# Patient Record
Sex: FEMALE | Race: WHITE | NOT HISPANIC OR LATINO | Employment: OTHER | URBAN - METROPOLITAN AREA
[De-identification: names, ages, dates, MRNs, and addresses within clinical notes are randomized per-mention and may not be internally consistent; named-entity substitution may affect disease eponyms.]

---

## 2017-03-24 ENCOUNTER — APPOINTMENT (OUTPATIENT)
Dept: GENERAL RADIOLOGY | Facility: HOSPITAL | Age: 82
End: 2017-03-24

## 2017-03-24 ENCOUNTER — HOSPITAL ENCOUNTER (OUTPATIENT)
Facility: HOSPITAL | Age: 82
Setting detail: OBSERVATION
LOS: 1 days | Discharge: HOME | End: 2017-03-25
Attending: INTERNAL MEDICINE | Admitting: HOSPITALIST

## 2017-03-24 ENCOUNTER — APPOINTMENT (OUTPATIENT)
Dept: MRI IMAGING | Facility: HOSPITAL | Age: 82
End: 2017-03-24

## 2017-03-24 ENCOUNTER — APPOINTMENT (OUTPATIENT)
Dept: CARDIOLOGY | Facility: HOSPITAL | Age: 82
End: 2017-03-24

## 2017-03-24 ENCOUNTER — APPOINTMENT (OUTPATIENT)
Dept: CARDIOLOGY | Facility: HOSPITAL | Age: 82
End: 2017-03-24
Attending: NEUROLOGICAL SURGERY

## 2017-03-24 PROBLEM — D69.1 THROMBOCYTOPATHIA: Status: ACTIVE | Noted: 2017-03-24

## 2017-03-24 PROBLEM — D64.9 ANEMIA: Status: ACTIVE | Noted: 2017-03-24

## 2017-03-24 PROBLEM — I65.22 LEFT CAROTID STENOSIS: Status: ACTIVE | Noted: 2017-03-24

## 2017-03-24 PROBLEM — I50.9 CHF (CONGESTIVE HEART FAILURE): Chronic | Status: ACTIVE | Noted: 2017-03-24

## 2017-03-24 PROBLEM — E78.5 HYPERLIPIDEMIA: Chronic | Status: ACTIVE | Noted: 2017-03-24

## 2017-03-24 PROBLEM — I48.91 ATRIAL FIBRILLATION (HCC): Chronic | Status: ACTIVE | Noted: 2017-03-24

## 2017-03-24 PROBLEM — I65.29 CAROTID STENOSIS: Status: ACTIVE | Noted: 2017-03-24

## 2017-03-24 PROBLEM — E03.9 HYPOTHYROIDISM: Status: ACTIVE | Noted: 2017-03-24

## 2017-03-24 PROBLEM — E87.6 HYPOKALEMIA: Status: ACTIVE | Noted: 2017-03-24

## 2017-03-24 PROBLEM — I10 HYPERTENSION: Chronic | Status: ACTIVE | Noted: 2017-03-24

## 2017-03-24 LAB
ALBUMIN SERPL-MCNC: 3.7 G/DL (ref 3.2–4.8)
ALBUMIN/GLOB SERPL: 1.4 G/DL (ref 1.5–2.5)
ALP SERPL-CCNC: 56 U/L (ref 25–100)
ALT SERPL W P-5'-P-CCNC: 14 U/L (ref 7–40)
ANION GAP SERPL CALCULATED.3IONS-SCNC: 3 MMOL/L (ref 3–11)
APTT PPP: 29.2 SECONDS (ref 24–31)
ARTICHOKE IGE QN: 56 MG/DL (ref 0–130)
AST SERPL-CCNC: 19 U/L (ref 0–33)
BASOPHILS # BLD AUTO: 0.02 10*3/MM3 (ref 0–0.2)
BASOPHILS NFR BLD AUTO: 0.4 % (ref 0–1)
BH CV ECHO MEAS - AO MAX PG (FULL): 54.3 MMHG
BH CV ECHO MEAS - AO MAX PG: 58.2 MMHG
BH CV ECHO MEAS - AO MEAN PG (FULL): 29 MMHG
BH CV ECHO MEAS - AO MEAN PG: 31 MMHG
BH CV ECHO MEAS - AO ROOT AREA (BSA CORRECTED): 1.4
BH CV ECHO MEAS - AO ROOT AREA: 4.5 CM^2
BH CV ECHO MEAS - AO ROOT DIAM: 2.4 CM
BH CV ECHO MEAS - AO V2 MAX: 381.2 CM/SEC
BH CV ECHO MEAS - AO V2 MEAN: 256.8 CM/SEC
BH CV ECHO MEAS - AO V2 VTI: 83.2 CM
BH CV ECHO MEAS - AVA(I,A): 0.71 CM^2
BH CV ECHO MEAS - AVA(I,D): 0.71 CM^2
BH CV ECHO MEAS - AVA(V,A): 0.65 CM^2
BH CV ECHO MEAS - AVA(V,D): 0.65 CM^2
BH CV ECHO MEAS - BSA(HAYCOCK): 1.7 M^2
BH CV ECHO MEAS - BSA: 1.7 M^2
BH CV ECHO MEAS - BZI_BMI: 24.9 KILOGRAMS/M^2
BH CV ECHO MEAS - BZI_METRIC_HEIGHT: 162.6 CM
BH CV ECHO MEAS - BZI_METRIC_WEIGHT: 65.8 KG
BH CV ECHO MEAS - EDV(CUBED): 42.5 ML
BH CV ECHO MEAS - EDV(TEICH): 50.5 ML
BH CV ECHO MEAS - EF(CUBED): 72.1 %
BH CV ECHO MEAS - EF(TEICH): 64.9 %
BH CV ECHO MEAS - ESV(CUBED): 11.9 ML
BH CV ECHO MEAS - ESV(TEICH): 17.7 ML
BH CV ECHO MEAS - FS: 34.7 %
BH CV ECHO MEAS - IVS/LVPW: 1.2
BH CV ECHO MEAS - IVSD: 1.3 CM
BH CV ECHO MEAS - LA DIMENSION: 3.8 CM
BH CV ECHO MEAS - LA/AO: 1.6
BH CV ECHO MEAS - LAT PEAK E' VEL: 7.7 CM/SEC
BH CV ECHO MEAS - LV MASS(C)D: 129.3 GRAMS
BH CV ECHO MEAS - LV MASS(C)DI: 75.8 GRAMS/M^2
BH CV ECHO MEAS - LV MAX PG: 3.9 MMHG
BH CV ECHO MEAS - LV MEAN PG: 2 MMHG
BH CV ECHO MEAS - LV V1 MAX: 98.1 CM/SEC
BH CV ECHO MEAS - LV V1 MEAN: 67 CM/SEC
BH CV ECHO MEAS - LV V1 VTI: 23.1 CM
BH CV ECHO MEAS - LVIDD: 3.5 CM
BH CV ECHO MEAS - LVIDS: 2.3 CM
BH CV ECHO MEAS - LVOT AREA (M): 2.5 CM^2
BH CV ECHO MEAS - LVOT AREA: 2.5 CM^2
BH CV ECHO MEAS - LVOT DIAM: 1.8 CM
BH CV ECHO MEAS - LVPWD: 1.1 CM
BH CV ECHO MEAS - MED PEAK E' VEL: 6.6 CM/SEC
BH CV ECHO MEAS - MV E MAX VEL: 115 CM/SEC
BH CV ECHO MEAS - MV MAX PG: 6.9 MMHG
BH CV ECHO MEAS - MV MEAN PG: 2 MMHG
BH CV ECHO MEAS - MV V2 MAX: 131 CM/SEC
BH CV ECHO MEAS - MV V2 MEAN: 69.3 CM/SEC
BH CV ECHO MEAS - MV V2 VTI: 26.4 CM
BH CV ECHO MEAS - MVA(VTI): 2.2 CM^2
BH CV ECHO MEAS - PA ACC SLOPE: 688.5 CM/SEC^2
BH CV ECHO MEAS - PA ACC TIME: 0.1 SEC
BH CV ECHO MEAS - PA MAX PG: 2.6 MMHG
BH CV ECHO MEAS - PA PR(ACCEL): 36 MMHG
BH CV ECHO MEAS - PA V2 MAX: 80.4 CM/SEC
BH CV ECHO MEAS - RAP SYSTOLE: 3 MMHG
BH CV ECHO MEAS - RVDD: 2.4 CM
BH CV ECHO MEAS - RVSP: 55 MMHG
BH CV ECHO MEAS - SI(AO): 220.7 ML/M^2
BH CV ECHO MEAS - SI(CUBED): 18 ML/M^2
BH CV ECHO MEAS - SI(LVOT): 34.5 ML/M^2
BH CV ECHO MEAS - SI(TEICH): 19.2 ML/M^2
BH CV ECHO MEAS - SV(AO): 376.5 ML
BH CV ECHO MEAS - SV(CUBED): 30.7 ML
BH CV ECHO MEAS - SV(LVOT): 58.8 ML
BH CV ECHO MEAS - SV(TEICH): 32.8 ML
BH CV ECHO MEAS - TAPSE (>1.6): 1.7 CM2
BH CV ECHO MEAS - TR MAX VEL: 360 CM/SEC
BH CV XLRA - RV BASE: 3.7 CM
BH CV XLRA - RV LENGTH: 6.4 CM
BH CV XLRA - RV MID: 2.7 CM
BH CV XLRA - TDI S': 7.8 CM/SEC
BH CV XLRA MEAS LEFT CCA RATIO VEL: 70.4 CM/SEC
BH CV XLRA MEAS LEFT DIST CCA EDV: 10.1 CM/SEC
BH CV XLRA MEAS LEFT DIST CCA PSV: 70.4 CM/SEC
BH CV XLRA MEAS LEFT DIST ICA EDV: 25.1 CM/SEC
BH CV XLRA MEAS LEFT DIST ICA PSV: 209 CM/SEC
BH CV XLRA MEAS LEFT ICA RATIO VEL: 151 CM/SEC
BH CV XLRA MEAS LEFT ICA/CCA RATIO: 2.1
BH CV XLRA MEAS LEFT MID ICA EDV: 25.1 CM/SEC
BH CV XLRA MEAS LEFT MID ICA PSV: 151 CM/SEC
BH CV XLRA MEAS LEFT PROX CCA EDV: 8.2 CM/SEC
BH CV XLRA MEAS LEFT PROX CCA PSV: 59.7 CM/SEC
BH CV XLRA MEAS LEFT PROX ECA PSV: 69.2 CM/SEC
BH CV XLRA MEAS LEFT PROX ICA EDV: 14.2 CM/SEC
BH CV XLRA MEAS LEFT PROX ICA PSV: 72.7 CM/SEC
BH CV XLRA MEAS LEFT PROX SCLA PSV: 131 CM/SEC
BH CV XLRA MEAS RIGHT CCA RATIO VEL: 56.3 CM/SEC
BH CV XLRA MEAS RIGHT DIST CCA EDV: 7.9 CM/SEC
BH CV XLRA MEAS RIGHT DIST CCA PSV: 56.3 CM/SEC
BH CV XLRA MEAS RIGHT DIST ICA EDV: 30.8 CM/SEC
BH CV XLRA MEAS RIGHT DIST ICA PSV: 124 CM/SEC
BH CV XLRA MEAS RIGHT ICA RATIO VEL: 98.7 CM/SEC
BH CV XLRA MEAS RIGHT ICA/CCA RATIO: 1.8
BH CV XLRA MEAS RIGHT MID ICA EDV: 27.7 CM/SEC
BH CV XLRA MEAS RIGHT MID ICA PSV: 98.7 CM/SEC
BH CV XLRA MEAS RIGHT PROX CCA EDV: 11.8 CM/SEC
BH CV XLRA MEAS RIGHT PROX CCA PSV: 58.5 CM/SEC
BH CV XLRA MEAS RIGHT PROX ECA PSV: 70.7 CM/SEC
BH CV XLRA MEAS RIGHT PROX ICA EDV: 15.3 CM/SEC
BH CV XLRA MEAS RIGHT PROX ICA PSV: 72 CM/SEC
BH CV XLRA MEAS RIGHT PROX SCLA PSV: 51.1 CM/SEC
BH CV XLRA MEAS RIGHT VERTEBRAL A EDV: 13.8 CM/SEC
BH CV XLRA MEAS RIGHT VERTEBRAL A PSV: 64.1 CM/SEC
BILIRUB SERPL-MCNC: 0.4 MG/DL (ref 0.3–1.2)
BUN BLD-MCNC: 12 MG/DL (ref 9–23)
BUN/CREAT SERPL: 17.1 (ref 7–25)
CALCIUM SPEC-SCNC: 8.9 MG/DL (ref 8.7–10.4)
CHLORIDE SERPL-SCNC: 105 MMOL/L (ref 99–109)
CHOLEST SERPL-MCNC: 108 MG/DL (ref 0–200)
CO2 SERPL-SCNC: 30 MMOL/L (ref 20–31)
CREAT BLD-MCNC: 0.7 MG/DL (ref 0.6–1.3)
DEPRECATED RDW RBC AUTO: 50.4 FL (ref 37–54)
E/E' RATIO: 16
EOSINOPHIL # BLD AUTO: 0.27 10*3/MM3 (ref 0.1–0.3)
EOSINOPHIL NFR BLD AUTO: 4.9 % (ref 0–3)
ERYTHROCYTE [DISTWIDTH] IN BLOOD BY AUTOMATED COUNT: 13.4 % (ref 11.3–14.5)
GFR SERPL CREATININE-BSD FRML MDRD: 80 ML/MIN/1.73
GLOBULIN UR ELPH-MCNC: 2.7 GM/DL
GLUCOSE BLD-MCNC: 83 MG/DL (ref 70–100)
HBA1C MFR BLD: 5.3 % (ref 4.8–5.6)
HCT VFR BLD AUTO: 33.9 % (ref 34.5–44)
HDLC SERPL-MCNC: 33 MG/DL (ref 40–60)
HGB BLD-MCNC: 11.1 G/DL (ref 11.5–15.5)
IMM GRANULOCYTES # BLD: 0.01 10*3/MM3 (ref 0–0.03)
IMM GRANULOCYTES NFR BLD: 0.2 % (ref 0–0.6)
INR PPP: 1.12
LEFT ATRIUM VOLUME INDEX: 45 ML/M2
LEFT ATRIUM VOLUME: 78 CM3
LV EF 2D ECHO EST: 60 %
LYMPHOCYTES # BLD AUTO: 1.59 10*3/MM3 (ref 0.6–4.8)
LYMPHOCYTES NFR BLD AUTO: 28.8 % (ref 24–44)
MAGNESIUM SERPL-MCNC: 1.9 MG/DL (ref 1.3–2.7)
MCH RBC QN AUTO: 33.4 PG (ref 27–31)
MCHC RBC AUTO-ENTMCNC: 32.7 G/DL (ref 32–36)
MCV RBC AUTO: 102.1 FL (ref 80–99)
MONOCYTES # BLD AUTO: 0.67 10*3/MM3 (ref 0–1)
MONOCYTES NFR BLD AUTO: 12.1 % (ref 0–12)
NEUTROPHILS # BLD AUTO: 2.96 10*3/MM3 (ref 1.5–8.3)
NEUTROPHILS NFR BLD AUTO: 53.6 % (ref 41–71)
PHOSPHATE SERPL-MCNC: 3.9 MG/DL (ref 2.4–5.1)
PLATELET # BLD AUTO: 120 10*3/MM3 (ref 150–450)
PMV BLD AUTO: 9.5 FL (ref 6–12)
POTASSIUM BLD-SCNC: 3.5 MMOL/L (ref 3.5–5.5)
PROT SERPL-MCNC: 6.4 G/DL (ref 5.7–8.2)
PROTHROMBIN TIME: 12.3 SECONDS (ref 9.6–11.5)
RBC # BLD AUTO: 3.32 10*6/MM3 (ref 3.89–5.14)
SODIUM BLD-SCNC: 138 MMOL/L (ref 132–146)
T4 FREE SERPL-MCNC: 1.03 NG/DL (ref 0.89–1.76)
TRIGL SERPL-MCNC: 69 MG/DL (ref 0–150)
TROPONIN I SERPL-MCNC: <0.006 NG/ML
TSH SERPL DL<=0.05 MIU/L-ACNC: 1.94 MIU/ML (ref 0.35–5.35)
WBC NRBC COR # BLD: 5.52 10*3/MM3 (ref 3.5–10.8)

## 2017-03-24 PROCEDURE — 93306 TTE W/DOPPLER COMPLETE: CPT | Performed by: INTERNAL MEDICINE

## 2017-03-24 PROCEDURE — 99220 PR INITIAL OBSERVATION CARE/DAY 70 MINUTES: CPT | Performed by: INTERNAL MEDICINE

## 2017-03-24 PROCEDURE — 99221 1ST HOSP IP/OBS SF/LOW 40: CPT | Performed by: NEUROLOGICAL SURGERY

## 2017-03-24 PROCEDURE — 85610 PROTHROMBIN TIME: CPT | Performed by: NURSE PRACTITIONER

## 2017-03-24 PROCEDURE — 93880 EXTRACRANIAL BILAT STUDY: CPT

## 2017-03-24 PROCEDURE — G0378 HOSPITAL OBSERVATION PER HR: HCPCS

## 2017-03-24 PROCEDURE — 93880 EXTRACRANIAL BILAT STUDY: CPT | Performed by: INTERNAL MEDICINE

## 2017-03-24 PROCEDURE — 84439 ASSAY OF FREE THYROXINE: CPT | Performed by: NURSE PRACTITIONER

## 2017-03-24 PROCEDURE — 85730 THROMBOPLASTIN TIME PARTIAL: CPT | Performed by: NURSE PRACTITIONER

## 2017-03-24 PROCEDURE — 93010 ELECTROCARDIOGRAM REPORT: CPT | Performed by: INTERNAL MEDICINE

## 2017-03-24 PROCEDURE — 70551 MRI BRAIN STEM W/O DYE: CPT

## 2017-03-24 PROCEDURE — 84484 ASSAY OF TROPONIN QUANT: CPT | Performed by: NURSE PRACTITIONER

## 2017-03-24 PROCEDURE — 83036 HEMOGLOBIN GLYCOSYLATED A1C: CPT | Performed by: NURSE PRACTITIONER

## 2017-03-24 PROCEDURE — 71010 HC CHEST PA OR AP: CPT

## 2017-03-24 PROCEDURE — 84443 ASSAY THYROID STIM HORMONE: CPT | Performed by: NURSE PRACTITIONER

## 2017-03-24 PROCEDURE — 83735 ASSAY OF MAGNESIUM: CPT | Performed by: NURSE PRACTITIONER

## 2017-03-24 PROCEDURE — C8929 TTE W OR WO FOL WCON,DOPPLER: HCPCS

## 2017-03-24 PROCEDURE — 85025 COMPLETE CBC W/AUTO DIFF WBC: CPT | Performed by: NURSE PRACTITIONER

## 2017-03-24 PROCEDURE — 80061 LIPID PANEL: CPT | Performed by: NURSE PRACTITIONER

## 2017-03-24 PROCEDURE — 93005 ELECTROCARDIOGRAM TRACING: CPT | Performed by: PHYSICIAN ASSISTANT

## 2017-03-24 PROCEDURE — 25010000002 SULFUR HEXAFLUORIDE MICROSPH 60.7-25 MG RECONSTITUTED SUSPENSION: Performed by: INTERNAL MEDICINE

## 2017-03-24 PROCEDURE — 84100 ASSAY OF PHOSPHORUS: CPT | Performed by: NURSE PRACTITIONER

## 2017-03-24 PROCEDURE — 84481 FREE ASSAY (FT-3): CPT | Performed by: NURSE PRACTITIONER

## 2017-03-24 PROCEDURE — 80053 COMPREHEN METABOLIC PANEL: CPT | Performed by: NURSE PRACTITIONER

## 2017-03-24 RX ORDER — CLOPIDOGREL BISULFATE 75 MG/1
75 TABLET ORAL DAILY
COMMUNITY
End: 2022-03-23

## 2017-03-24 RX ORDER — POTASSIUM CHLORIDE 1.5 G/1.77G
40 POWDER, FOR SOLUTION ORAL AS NEEDED
Status: DISCONTINUED | OUTPATIENT
Start: 2017-03-24 | End: 2017-03-25 | Stop reason: HOSPADM

## 2017-03-24 RX ORDER — ESCITALOPRAM OXALATE 20 MG/1
20 TABLET ORAL DAILY
COMMUNITY
End: 2022-08-12

## 2017-03-24 RX ORDER — GABAPENTIN 600 MG/1
600 TABLET ORAL NIGHTLY
COMMUNITY
End: 2022-06-15 | Stop reason: SDUPTHER

## 2017-03-24 RX ORDER — GABAPENTIN 300 MG/1
600 CAPSULE ORAL NIGHTLY
Status: DISCONTINUED | OUTPATIENT
Start: 2017-03-24 | End: 2017-03-25 | Stop reason: HOSPADM

## 2017-03-24 RX ORDER — POTASSIUM CHLORIDE 750 MG/1
40 CAPSULE, EXTENDED RELEASE ORAL AS NEEDED
Status: DISCONTINUED | OUTPATIENT
Start: 2017-03-24 | End: 2017-03-25 | Stop reason: HOSPADM

## 2017-03-24 RX ORDER — DOCUSATE SODIUM 100 MG/1
100 CAPSULE, LIQUID FILLED ORAL 2 TIMES DAILY PRN
Status: DISCONTINUED | OUTPATIENT
Start: 2017-03-24 | End: 2017-03-25 | Stop reason: HOSPADM

## 2017-03-24 RX ORDER — ESCITALOPRAM OXALATE 20 MG/1
20 TABLET ORAL DAILY
Status: DISCONTINUED | OUTPATIENT
Start: 2017-03-24 | End: 2017-03-25 | Stop reason: HOSPADM

## 2017-03-24 RX ORDER — FAMOTIDINE 20 MG/1
40 TABLET, FILM COATED ORAL DAILY
Status: DISCONTINUED | OUTPATIENT
Start: 2017-03-24 | End: 2017-03-25 | Stop reason: HOSPADM

## 2017-03-24 RX ORDER — POTASSIUM CHLORIDE 7.45 MG/ML
10 INJECTION INTRAVENOUS
Status: DISCONTINUED | OUTPATIENT
Start: 2017-03-24 | End: 2017-03-25 | Stop reason: HOSPADM

## 2017-03-24 RX ORDER — GABAPENTIN 300 MG/1
300 CAPSULE ORAL EVERY MORNING
Status: DISCONTINUED | OUTPATIENT
Start: 2017-03-24 | End: 2017-03-25 | Stop reason: HOSPADM

## 2017-03-24 RX ORDER — AMLODIPINE BESYLATE 5 MG/1
5 TABLET ORAL DAILY
Status: DISCONTINUED | OUTPATIENT
Start: 2017-03-24 | End: 2017-03-25 | Stop reason: HOSPADM

## 2017-03-24 RX ORDER — MECLIZINE HYDROCHLORIDE 25 MG/1
25 TABLET ORAL 3 TIMES DAILY PRN
Status: DISCONTINUED | OUTPATIENT
Start: 2017-03-24 | End: 2017-03-25 | Stop reason: HOSPADM

## 2017-03-24 RX ORDER — CLONIDINE HYDROCHLORIDE 0.1 MG/1
0.1 TABLET ORAL 2 TIMES DAILY PRN
Status: DISCONTINUED | OUTPATIENT
Start: 2017-03-24 | End: 2017-03-25 | Stop reason: HOSPADM

## 2017-03-24 RX ORDER — ATORVASTATIN CALCIUM 20 MG/1
20 TABLET, FILM COATED ORAL NIGHTLY
Status: DISCONTINUED | OUTPATIENT
Start: 2017-03-24 | End: 2017-03-25 | Stop reason: HOSPADM

## 2017-03-24 RX ORDER — HYDROCODONE BITARTRATE AND ACETAMINOPHEN 10; 325 MG/1; MG/1
2 TABLET ORAL DAILY
COMMUNITY
End: 2022-04-18 | Stop reason: SDUPTHER

## 2017-03-24 RX ORDER — FUROSEMIDE 20 MG/1
10 TABLET ORAL DAILY
COMMUNITY
End: 2022-03-23

## 2017-03-24 RX ORDER — GABAPENTIN 300 MG/1
300 CAPSULE ORAL EVERY MORNING
COMMUNITY
End: 2022-04-18 | Stop reason: SDUPTHER

## 2017-03-24 RX ORDER — FAMOTIDINE 40 MG/1
40 TABLET, FILM COATED ORAL DAILY
COMMUNITY
End: 2022-07-25

## 2017-03-24 RX ORDER — HYDROCODONE BITARTRATE AND ACETAMINOPHEN 7.5; 325 MG/1; MG/1
1 TABLET ORAL 2 TIMES DAILY PRN
Status: DISCONTINUED | OUTPATIENT
Start: 2017-03-24 | End: 2017-03-25 | Stop reason: HOSPADM

## 2017-03-24 RX ORDER — CLOPIDOGREL BISULFATE 75 MG/1
75 TABLET ORAL DAILY
Status: DISCONTINUED | OUTPATIENT
Start: 2017-03-24 | End: 2017-03-25 | Stop reason: HOSPADM

## 2017-03-24 RX ORDER — MECLIZINE HYDROCHLORIDE 25 MG/1
25 TABLET ORAL 3 TIMES DAILY PRN
COMMUNITY
End: 2022-03-23

## 2017-03-24 RX ORDER — SODIUM CHLORIDE 0.9 % (FLUSH) 0.9 %
1-10 SYRINGE (ML) INJECTION AS NEEDED
Status: DISCONTINUED | OUTPATIENT
Start: 2017-03-24 | End: 2017-03-25 | Stop reason: HOSPADM

## 2017-03-24 RX ORDER — ACETAMINOPHEN 325 MG/1
650 TABLET ORAL EVERY 4 HOURS PRN
Status: DISCONTINUED | OUTPATIENT
Start: 2017-03-24 | End: 2017-03-25 | Stop reason: HOSPADM

## 2017-03-24 RX ORDER — ATORVASTATIN CALCIUM 20 MG/1
20 TABLET, FILM COATED ORAL DAILY
COMMUNITY
End: 2022-03-23

## 2017-03-24 RX ORDER — ALENDRONATE SODIUM 70 MG/1
70 TABLET ORAL
COMMUNITY
Start: 2017-03-26 | End: 2022-05-31 | Stop reason: SDUPTHER

## 2017-03-24 RX ORDER — CLOPIDOGREL BISULFATE 75 MG/1
75 TABLET ORAL DAILY
Status: DISCONTINUED | OUTPATIENT
Start: 2017-03-24 | End: 2017-03-24

## 2017-03-24 RX ORDER — FUROSEMIDE 20 MG/1
10 TABLET ORAL DAILY
Status: DISCONTINUED | OUTPATIENT
Start: 2017-03-24 | End: 2017-03-25 | Stop reason: HOSPADM

## 2017-03-24 RX ORDER — ONDANSETRON 2 MG/ML
4 INJECTION INTRAMUSCULAR; INTRAVENOUS EVERY 6 HOURS PRN
Status: DISCONTINUED | OUTPATIENT
Start: 2017-03-24 | End: 2017-03-25 | Stop reason: HOSPADM

## 2017-03-24 RX ADMIN — GABAPENTIN 300 MG: 300 CAPSULE ORAL at 11:43

## 2017-03-24 RX ADMIN — FAMOTIDINE 40 MG: 20 TABLET ORAL at 11:42

## 2017-03-24 RX ADMIN — RIVAROXABAN 20 MG: 20 TABLET, FILM COATED ORAL at 11:43

## 2017-03-24 RX ADMIN — SULFUR HEXAFLUORIDE 2 ML: KIT at 14:30

## 2017-03-24 RX ADMIN — FUROSEMIDE 10 MG: 20 TABLET ORAL at 11:43

## 2017-03-24 RX ADMIN — ESCITALOPRAM OXALATE 20 MG: 20 TABLET ORAL at 11:43

## 2017-03-24 RX ADMIN — METOPROLOL TARTRATE 12.5 MG: 25 TABLET, FILM COATED ORAL at 18:32

## 2017-03-24 RX ADMIN — METOPROLOL TARTRATE 12.5 MG: 25 TABLET, FILM COATED ORAL at 09:09

## 2017-03-24 RX ADMIN — ATORVASTATIN CALCIUM 20 MG: 20 TABLET, FILM COATED ORAL at 20:03

## 2017-03-24 RX ADMIN — GABAPENTIN 600 MG: 300 CAPSULE ORAL at 20:03

## 2017-03-24 RX ADMIN — CLOPIDOGREL BISULFATE 75 MG: 75 TABLET ORAL at 11:43

## 2017-03-24 RX ADMIN — AMLODIPINE BESYLATE 5 MG: 5 TABLET ORAL at 09:09

## 2017-03-24 NOTE — H&P
"  [facility] Medicine Services  HISTORY AND PHYSICAL    Primary Care Physician: Zeeshan Ross MD    Subjective     Chief Complaint: \"Tingling in fingers, left neck pain.\"    History of Present Illness    Mrs. Green presented to [facility] this evening for numbness and tingling that started in her left 3rd-5th fingers, and spread into her hand and palm. Onset 2100. Moderate. Reports has mostly improved without intervention. She reports associated pain to the left side of her neck since around the same time, moderate, not improving. Son reports she also complained of a headache since 3/23 AM as well that they did not think anything of at the time. Pt reports she was concerned because she had a TIA about 15 years ago. She has been transferred here to [facility] for associated findings of significant LEFT carotid stenosis. To note, she has a surgical history that includes a RIGHT carotid endarterectomy around 1999 when had TIA.    Review of Systems   Constitutional: Negative for activity change, appetite change, chills, fever and unexpected weight change.        Reports 20 lb wt loss since c diff 2+ years ago she never gained back but no recent wt change.   HENT: Positive for sinus pressure. Negative for trouble swallowing.    Eyes: Negative for photophobia and visual disturbance.        Reduced vision in R eye stable since TIA 15 years ago.   Respiratory: Positive for cough and wheezing. Negative for chest tightness and shortness of breath.         Occasional wheezing but not today. Coughing every afternoon and at night.   Cardiovascular: Positive for leg swelling. Negative for chest pain.   Gastrointestinal: Negative for abdominal pain, blood in stool, constipation, diarrhea, nausea and vomiting.   Genitourinary: Negative for dysuria and hematuria.        Denies dark/odorous urine.   Musculoskeletal: Positive for arthralgias. Negative for joint swelling. " "       Reports pain to left neck but not in her spine.   Skin: Negative for color change, pallor, rash and wound.   Neurological: Positive for dizziness, weakness, light-headedness, numbness and headaches. Negative for syncope and speech difficulty.        \"I take a pill for the dizziness\"   Hematological: Bruises/bleeds easily.   Psychiatric/Behavioral: Negative for confusion.        Denies memory loss.      Otherwise complete ROS reviewed and negative except as mentioned in the HPI.    Past Medical History:   Past Medical History:   Diagnosis Date   • Anemia 3/24/2017   • Atrial fibrillation 3/24/2017   • C. 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   • TIA (transient ischemic attack)     history       Past Surgical History:  Past Surgical History:   Procedure Laterality Date   • CAROTID ENDARTERECTOMY Right     ~1999 deborah   • HEMORRHOIDECTOMY     • HYSTERECTOMY     • REPLACEMENT TOTAL HIP LATERAL POSITION      Unknown details, right hip replacement 2008       Family History:   Family History   Problem Relation Age of Onset   • Heart disease Mother    • Hypertension Mother    • Arthritis Mother    • Heart disease Father    • Hypertension Father    • Arthritis Father      Social History:   Social History     Social History   • Marital status:      Spouse name: N/A   • Number of children: N/A   • Years of education: N/A     Occupational History   • Not on file.     Social History Main Topics   • Smoking status: Never Smoker   • Smokeless tobacco: Not on file   • Alcohol use No   • Drug use: Not on file   • Sexual activity: Defer     Other " "Topics Concern   • Not on file     Social History Narrative    Mrs. Green is an 82 year old white  female who resides in [facility] with 24/7 in home care. 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 She has a living will that says full code, full support, and son (Chuck Green) as durable POA per pt and son.       Medications:  Prescriptions Prior to Admission   Medication Sig Dispense Refill Last Dose   • [START ON 3/26/2017] alendronate (FOSAMAX) 70 MG tablet Take 70 mg by mouth Every 7 (Seven) Days. Takes on Sundays      • AMLODIPINE BESYLATE PO Take 5 mg by mouth Daily.      • atorvastatin (LIPITOR) 20 MG tablet Take 20 mg by mouth Daily.      • clopidogrel (PLAVIX) 75 MG tablet Take 75 mg by mouth Daily.      • escitalopram (LEXAPRO) 20 MG tablet Take 20 mg by mouth Daily.      • famotidine (PEPCID) 40 MG tablet Take 40 mg by mouth Daily.      • furosemide (LASIX) 20 MG tablet Take 10 mg by mouth Daily.      • gabapentin (NEURONTIN) 300 MG capsule Take 300 mg by mouth Every Morning.      • gabapentin (NEURONTIN) 600 MG tablet Take 600 mg by mouth Every Night.      • HYDROcodone-acetaminophen (NORCO)  MG per tablet Take 2 tablets by mouth Daily.      • meclizine (ANTIVERT) 25 MG tablet Take 25 mg by mouth 3 (Three) Times a Day As Needed for dizziness.      • metoprolol tartrate (LOPRESSOR) 25 MG tablet Take 12.5 mg by mouth 2 (Two) Times a Day.      • rivaroxaban (XARELTO) 20 MG tablet Take 20 mg by mouth Daily.          EFRAIN collected, note her last norco fill is a 7.5/325 mg #90 from PCP on December 17 2016.    Allergies:  Allergies   Allergen Reactions   • Codeine Nausea And Vomiting   • Nitrofurantoin        Objective     Vital Signs: /82 (Patient Position: Lying)  Pulse 63  Temp 98.9 °F (37.2 °C) (Oral)   Resp 16  Ht 64\" (162.6 cm)  Wt 145 lb 4.8 oz (65.9 kg)  SpO2 99%  BMI 24.94 kg/m2  Physical Exam   Constitutional: She is " oriented to person, place, and time. She appears well-developed and well-nourished. No distress.   HENT:   Head: Normocephalic and atraumatic.   Very hard of hearing.   Eyes: Conjunctivae and EOM are normal. Pupils are equal, round, and reactive to light. Right eye exhibits no discharge. Left eye exhibits no discharge.   Neck: Normal range of motion. No JVD present. No tracheal deviation present.   Cardiovascular: Normal rate, normal heart sounds and intact distal pulses.  An irregular rhythm present.   Pulses:       Radial pulses are 2+ on the right side        Dorsalis pedis pulses are 2+ on the right side, and 2+ on the left side.   Pulmonary/Chest: Effort normal and breath sounds normal. No respiratory distress. She has no wheezes. She has no rales. She exhibits no tenderness.   Abdominal: Soft. Bowel sounds are normal. She exhibits no distension and no mass. There is no tenderness. There is no rebound and no guarding. No hernia.   Genitourinary:   Genitourinary Comments: Bladder non-distended.   Musculoskeletal: She exhibits no edema.   Neurological: She is alert and oriented to person, place, and time.   Equal . 4/5 strength.   Skin: Skin is warm and dry. No rash noted. She is not diaphoretic. No erythema. No pallor.   Psychiatric: She has a normal mood and affect. Her behavior is normal. Judgment and thought content normal.       Results Reviewed:    Lab Results (last 24 hours)     ** No results found for the last 24 hours. **          Outside records from [facility] reviewed in full. In summary    PT: INR 1.2  TSH mod elevated 5.2  Hypokalemia  CT angio head and neck- 88 % stenosis of left carotid artery and complete occlusion of left vertebral artery      I have personally reviewed and interpreted the radiology studies and ECG obtained at time of admission.     Assessment / Plan      Assessment & Plan  Principal Problem:    Left carotid stenosis  Active Problems:    Anemia     Hypothyroidism    Hypokalemia    Atrial fibrillation    CHF (congestive heart failure)    Hypertension    Hyperlipidemia    Thrombocytopathia    1. Left carotid stenosis:  - Consult Dr. Zavala - radiology neuro interventionist.  - Currently anticoagulated on xarelto for chronic a fib, also on plavix.  - s/p right carotid endarterectomy ~1999 when had TIA.  - Statin.    2. Atrial fibrillation:  - Chronic xarelto, currently anticoagulated.  - Baseline EKG this AM (can check QTc for her lexapro here too).    3. Hypertension:  - Home clonidine as a PRN.    4. Hyperlipidemia:  - Statin.    5. Thrombocytopenia:  - Monitor for now, 126.    6. Chronic unspecified congestive heart failure:  - Unknown details or EF. Check ECHO.    7. Anemia:  - Macrocytic. Mild. H/H 11.4/34.  - Recheck CBC.    8. Hypothyroidism:  - New diagnosis based on elevated TSH at outlying facility - recheck TSH and add FT3 & FT4.    9. Hypokalemia:  - Replacement protocol, no CKD.  - Recheck metabolic pnl.    DVT prophylaxis: Teds, scuds, xarelto.    Code Status: Full code, full support, son Chuck Green is durable POA.       I discussed the patients findings and my recommendations with the patient, durable POA, and the primary care team.    Nely Song, APRN 03/24/17 6:24 AM      Brief Attending Note     I have seen and examined the patient, performing an independent face-to-face diagnostic evaluation with plan of care reviewed and developed with the advanced practice clinician (APC).      Brief Summary Statement/HPI:   82 yoF with hx of afib on xarelto and BB, essential hypertension, prior right carotid stenosis s/p endarterectomy, and chronic left vertebral artery stenosis. She p/w acute onset left neck pain associated with LUE paresthesia.  She chronically has left sided weakness. CT angio head and neck concerning for 88 % stenosis of left carotid artery.    Attending Physical Exam:  Temp:  [98.9 °F (37.2 °C)] 98.9 °F (37.2 °C)  Heart  Rate:  [63] 63  Resp:  [16] 16  BP: (135)/(82) 135/82     Constitutional: elderly female in no acute distress, awake, alert  Eyes: PERRLA, sclerae anicteric, no conjunctival injection  Neck: supple, no thyromegaly, trachea midline  Respiratory: Clear to auscultation bilaterally, nonlabored respirations   Cardiovascular: RRR, no murmurs, rubs, or gallops, palpable pedal pulses bilaterally  Gastrointestinal: Positive bowel sounds, soft, nontender, nondistended  Musculoskeletal: No bilateral ankle edema, no clubbing or cyanosis to bilateral lower extremities  Psychiatric: oriented x 3, appropriate affect, cooperative  Neurologic: reduced LLE strength symmetric in all extremities, Cranial Nerves grossly intact to confrontation     Brief Assessment/Plan :    # Symptomatic Left carotid stenosis - 88% occlusion per CT angio head and neck at OSH  # Hx of right endarterectomy 18yrs ago per Dr. Harris  # Hx of Afib on Xarelto and metoprolol, controlled  # Essential hypertension- labile per report, stable here  # Left vertebral artery stenosis, suspected complete occlusion-chronic per son  # Depression- stable on Lexapro  # Thrombocytopenia- suspect chronic  # Hypokalemia -unknown etiology, suspect sec to diuresis    Plan:  - Keep NPO and Consult Dr. Chidi Rosario  - Hold Xarelto, Plavix this morning, pending definite interventional plans  - continue antihypertensives  - continue Lexapro  - monitor and replete electrolytes.  - PT/OT    See above for further detailed assessment and plan developed with APC which I have reviewed and/or edited.    I believe this patient meets INPATIENT status due to the need for care which can only be reasonably provided in an hospital setting such as aggressive/expedited ancillary services and/or consultation services and the necessity for IV medications, close physician monitoring and/or the possible need for procedures.  In such, I feel patient’s risk for adverse outcomes and need for care  warrant INPATIENT evaluation and predict the patient’s care encounter to likely last beyond 2 midnights.    Tosha Pearson MD  03/24/17  6:27 AM

## 2017-03-24 NOTE — PROGRESS NOTES
Discharge Planning Assessment  [facility]     Patient Name: Emily Green  MRN: [id number]  Today's Date: 3/24/2017    Admit Date: 3/24/2017          Discharge Needs Assessment       03/24/17 0901    Living Environment    Lives With other (see comments)    Living Arrangements house    Provides Primary Care For no one, unable/limited ability to care for self    Primary Care Provided By child(luther) (specify);other (see comments)    Unique Family Situation Family has arranged for 24/7 in home care for pt    Quality Of Family Relationships supportive;helpful;involved    Able to Return to Prior Living Arrangements yes    Discharge Needs Assessment    Concerns To Be Addressed discharge planning concerns    Readmission Within The Last 30 Days no previous admission in last 30 days    Anticipated Changes Related to Illness inability to care for self    Equipment Currently Used at Home walker, rolling    Equipment Needed After Discharge none    Discharge Facility/Level Of Care Needs other (see comments)    Transportation Available car;family or friend will provide    Discharge Planning Comments Pt has 24/7 caregivers at home            Discharge Plan       03/24/17 0920    Case Management/Social Work Plan    Plan Pt reports her goal for discharge is to return home    Patient/Family In Agreement With Plan yes    Additional Comments Pt lives with 24/7 caregivers her family has arranged in [place].  She uses a rolling walker for moblility but denies use of any other DME.  She states her caregivers assist her in whatever ADLs she is unable to do on her own.  Pts son confirms this.  She is followed by her PCP and reports her medications are covered by insurance.  She uses [facility] pharmacy.  Pt and her son report their goal for discharge is for pt to return home.  CM to follow.        Discharge Placement     No information found        Expected Discharge Date and Time     Expected Discharge Date Expected Discharge Time    Mar 26,  2017               Demographic Summary       03/24/17 0941    Referral Information    Admission Type inpatient    Referral Source physician    Reason For Consult discharge planning    Record Reviewed history and physical    Contact Information    Permission Granted to Share Information With ;family/designee    Primary Care Physician Information    Name Dr Daiony Ross            Functional Status       03/24/17 0942    Functional Status Current    Current Functional Level Comment See PT eval    Functional Status Prior    Ambulation 1-->assistive equipment    Transferring 1-->assistive equipment    Toileting 3-->assistive equipment and person    Bathing 0-->independent    Dressing 2-->assistive person    Eating 0-->independent    Communication 0-->understands/communicates without difficulty    Swallowing 0-->swallows foods/liquids without difficulty    IADL    Medications assistive person    Meal Preparation assistive person    Housekeeping assistive person    Laundry assistive person    Shopping assistive person    Oral Care independent            Psychosocial     None            Abuse/Neglect     None            Legal     None            Substance Abuse     None            Patient Forms     None          Lorraine Madrigal RN

## 2017-03-24 NOTE — PLAN OF CARE
Problem: Patient Care Overview (Adult)  Goal: Plan of Care Review  Outcome: Ongoing (interventions implemented as appropriate)    Problem: Activity Intolerance (Adult)  Goal: Identify Related Risk Factors and Signs and Symptoms  Outcome: Ongoing (interventions implemented as appropriate)    03/24/17 0513   Activity Intolerance   Activity Intolerance: Related Risk Factors generalized weakness

## 2017-03-24 NOTE — CONSULTS
NEUROSURGERY CONSULTATION    Referring Provider: Tosha Pearson MD    Patient Care Team:  Zeeshan Ross MD as PCP - General (Family Medicine)    Chief Complaint: Asked to see for left-sided carotid stenosis history of possible TIA    History of Present Illness:   Patient's a very pleasant 82-year-old female who is on Xarelto and Plavix.  She presented yesterday to [facility] with a history of left-sided numbness involving the third fourth and fifth digits which spread to the hand and the proximal arm.  She denied any leg weakness at that time but has a bad hip no involvement of the face was noted no speech issues.    She has a history of a prior TIA with faculty in vision in her right eye she is status post right-sided carotid endarterectomy number of years ago (greater than 10).  She does have problems with labile blood pressures anywhere between the 60s and 200s she experiences presyncopal episodes as well as occasional headaches with hypertension as well.    She is back to her neurologic baseline today denies any other symptomatology.      Review of Systems:  Musculoskeletal and Neurological systems were reviewed and are negative except for:  Musculoskeletal: positive for joint stiffness  Neurological: positive for headaches    History:  Past Medical History:   Diagnosis Date   • Anemia 3/24/2017   • Atrial fibrillation 3/24/2017   • C. 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   • TIA (transient ischemic attack)     history   ,   Past Surgical History:   Procedure Laterality Date   • CAROTID ENDARTERECTOMY Right     ~1999  "deborah   • HEMORRHOIDECTOMY     • HYSTERECTOMY     • REPLACEMENT TOTAL HIP LATERAL POSITION      Unknown details, right hip replacement 2008   ,   Family History   Problem Relation Age of Onset   • Heart disease Mother    • Hypertension Mother    • Arthritis Mother    • Heart disease Father    • Hypertension Father    • Arthritis Father    ,   Social History   Substance Use Topics   • Smoking status: Never Smoker   • Smokeless tobacco: None   • Alcohol use No   ,   Prescriptions Prior to Admission   Medication Sig Dispense Refill Last Dose   • [START ON 3/26/2017] alendronate (FOSAMAX) 70 MG tablet Take 70 mg by mouth Every 7 (Seven) Days. Takes on Sundays      • AMLODIPINE BESYLATE PO Take 5 mg by mouth Daily.      • atorvastatin (LIPITOR) 20 MG tablet Take 20 mg by mouth Daily.      • clopidogrel (PLAVIX) 75 MG tablet Take 75 mg by mouth Daily.      • escitalopram (LEXAPRO) 20 MG tablet Take 20 mg by mouth Daily.      • famotidine (PEPCID) 40 MG tablet Take 40 mg by mouth Daily.      • furosemide (LASIX) 20 MG tablet Take 10 mg by mouth Daily.      • gabapentin (NEURONTIN) 300 MG capsule Take 300 mg by mouth Every Morning.      • gabapentin (NEURONTIN) 600 MG tablet Take 600 mg by mouth Every Night.      • HYDROcodone-acetaminophen (NORCO)  MG per tablet Take 2 tablets by mouth Daily.      • meclizine (ANTIVERT) 25 MG tablet Take 25 mg by mouth 3 (Three) Times a Day As Needed for dizziness.      • metoprolol tartrate (LOPRESSOR) 25 MG tablet Take 12.5 mg by mouth 2 (Two) Times a Day.      • rivaroxaban (XARELTO) 20 MG tablet Take 20 mg by mouth Daily.       and Allergies:  Codeine and Nitrofurantoin  Vital signs were 160s bilaterally in the upper extremities    Physical Exam:  Vital Signs: Blood pressure 146/65, pulse 81, temperature 97.3 °F (36.3 °C), temperature source Oral, resp. rate 16, height 64\" (162.6 cm), weight 145 lb 4.8 oz (65.9 kg), SpO2 98 %.  Vital signs were reviewed and documented in the " chart  Patient appeared in good neurologic function with normal comprehension fluent speech  Mood and affect are normal  Sense of smell deferred    Pupils symmetric equally reactive funduscopic exam not visualized   Visual fields intact to confrontation mild decreased visual acuity in the right eye  Extraocular movements intact  Face motor function is symmetric  Facial sensations normal  Hearing intact to hearing   Tongue is midline  Palate symmetric  Swallowing normal  Shoulder shrug normal  No pulse discrepancies in the upper extremities  Muscle bulk and tone normal  5 out of 5 strength no motor drift  Gait not tested but normal for age  No cerebellar dysfunction finger-nose-finger normal  No clonus long tract signs or myelopathy  No evidence of Tinel's at the left elbow to suggest ulnar disease  Reflexes symmetric  No edema noted and extremities skin appears normal    Feet are warm and well perfused        Data Review:  Reviewed a CT scan personally demonstrates no evidence of strokes  CTA reports demonstrates a high-grade left-sided carotid stenosis right-sided was relatively unremarkable      Diagnosis:  Spell involving left upper extremity discordant symptoms could be TIA however also to be subclavian stenosis with hypoperfusion syndrome blood pressures normal between arms however    Treatment Recommendations:  1.  Spell of unknown etiology sounds less likely to be a TIA additionally left-sided carotid stenosis would be discordant and  no evidence of critical stenosis of the right carotid by CT a report.  They did note on the CTA report the possibility of subclavian stenosis however there is no discrepancies in the blood pressures in the arms      From my standpoint I think is reasonable to pursue an MRI given cardiovascular risk factors to exclude ischemia additionally I would get a carotid artery duplex of the carotids as well as the vertebral arteries to exclude vertebral steel and to confirm the presence  of the stenosis.  Given medical risk factors she is likely to be managed medically for her left asymptomatic disease unless the MRI shows otherwise    If the MRI is unremarkable we can pursue outpatient follow-up I be happy to see her in the office to follow her carotid disease in 6 months with repeated duplex sonography.    She may continue her Xarelto and Plavix per her prior recommendations and eat today is no intervention is planned.      Nathaniel Edmondson MD  03/24/17  9:51 AM             Systolic Diastolic     CCA Prox 58.5 cm/sec       11.8 cm/sec           CCA Dist 56.3 cm/sec       7.9 cm/sec           ICA Prox 72.0 cm/sec       15.3 cm/sec           ICA Mid 98.7 cm/sec       27.7 cm/sec           ICA Dist 124.0 cm/sec       30.8 cm/sec           ECA 70.7 cm/sec               Vertebral 64.1 cm/sec       13.8 cm/sec           Subclavian 51.1 cm/sec               ICA/CCA 1.8                  Carotid Velocities - Left Side       Systolic Diastolic     CCA Prox 59.7 cm/sec       8.2 cm/sec           CCA Dist 70.4 cm/sec       10.1 cm/sec           ICA Prox 72.7 cm/sec       14.2 cm/sec           ICA Mid 151.0 cm/sec       25.1 cm/sec           ICA Dist 209.0 cm/sec       25.1 cm/sec           ECA 69.2 cm/sec               Subclavian 131.0 cm/sec               ICA/CCA 2.1                    No evidence of flow limiting stenosis by ultrasound  MRI pending if no stroke may d/c from ns standpoint

## 2017-03-24 NOTE — PROGRESS NOTES
[facility] Medicine Services  INPATIENT PROGRESS NOTE    Date of Admission: 3/24/2017  Length of Stay: 1  Primary Care Physician: Zeeshan Ross MD    Subjective   CC: tingling left hand  HPI:  Pt resting in bed, reports feeling much better. Still has minimal residual tingling in left hand at times, numbness and neck pain resolves. Had episode of weakness with ambulating to restroom, nsg present, was able to stablize. Will check orthostatics. Apparently, per caregiver's report since starting pain rx for chronic hip pain has had more issues with this, had to decrease dose. Pt denies HA/dizziness, SOA, orthopnea, CP, palpitations, N/V.       Review Of Systems:   Review of Systems   Respiratory: Negative for cough and shortness of breath.    Cardiovascular: Negative for chest pain and palpitations.   Gastrointestinal: Negative for nausea and vomiting.   Genitourinary: Negative for difficulty urinating and dysuria.   Musculoskeletal: Positive for arthralgias and gait problem.   Neurological: Positive for weakness. Negative for dizziness and headaches.         Objective      Temp:  [97.3 °F (36.3 °C)-98.9 °F (37.2 °C)] 98.8 °F (37.1 °C)  Heart Rate:  [63-81] 71  Resp:  [16] 16  BP: (135-182)/(65-89) 170/89  Physical Exam  Temp:  [97.3 °F (36.3 °C)-98.9 °F (37.2 °C)] 98.8 °F (37.1 °C)  Heart Rate:  [63-81] 71  Resp:  [16] 16  BP: (135-182)/(65-89) 170/89  Constitutional:very thin framed,  no acute distress, awake, alert  Eyes:sclerae anicteric, no conjunctival injection  Neck: supple,, trachea midline  Respiratory: Clear to auscultation bilaterally, nonlabored respirations   Cardiovascular: irregularly irregular, systolic murmur III/VI, no rubs, or gallops, palpable pedal pulses bilaterally  Gastrointestinal: Positive bowel sounds, soft, nontender, nondistended  Musculoskeletal: No bilateral ankle edema, no clubbing or cyanosis to bilateral lower extremities  Psychiatric: oriented x 3,  appropriate affect, cooperative  Neurologic: muscle strength 5/5 right UE/LE, 4/5 LUE/3/5LLE , follows commands, speech is clear    Results Review:    I have reviewed the labs, radiology results and diagnostic studies.      Results from last 7 days  Lab Units 03/24/17 0650   WBC 10*3/mm3 5.52   HEMOGLOBIN g/dL 11.1*   PLATELETS 10*3/mm3 120*       Results from last 7 days  Lab Units 03/24/17 0650   SODIUM mmol/L 138   POTASSIUM mmol/L 3.5   CHLORIDE mmol/L 105   TOTAL CO2 mmol/L 30.0   BUN mg/dL 12   CREATININE mg/dL 0.70   GLUCOSE mg/dL 83   CALCIUM mg/dL 8.9       TSH [[id number]] (Normal) Collected: 03/24/17 0650       Lab Status: Final result Specimen: Blood Updated: 03/24/17 0801        TSH 1.936 mIU/mL        Magnesium [[id number]] (Normal) Collected: 03/24/17 0650       Lab Status: Final result Specimen: Blood Updated: 03/24/17 0801        Magnesium 1.9 mg/dL        Phosphorus [[id number]] (Normal) Collected: 03/24/17 0650       Lab Status: Final result Specimen: Blood Updated: 03/24/17 0801        Phosphorus 3.9 mg/dL        T3, Free [[id number]] Collected: 03/24/17 0650       Lab Status: In process Specimen: Blood Updated: 03/24/17 0722       T4, Free [[id number]] (Normal) Collected: 03/24/17 0650       Lab Status: Final result Specimen: Blood Updated: 03/24/17 0801        Free T4 1.03 ng/dL        Lipid Panel [[id number]] (Abnormal) Collected: 03/24/17 0650       Lab Status: Final result Specimen: Blood Updated: 03/24/17 0801        Total Cholesterol 108 mg/dL         Triglycerides 69 mg/dL         HDL Cholesterol 33 (L) mg/dL         LDL Cholesterol  56 mg/dL        Narrative:         Cholesterol Reference Ranges:   Desirable       < 200 mg/dL        Culture Data: Cultures:         Radiology Data:   Imaging Results (most recent)     Procedure Component Value Units Date/Time    XR Chest 1 View [[id number]] Collected:  03/24/17 0941     Updated:  03/24/17 1252    Narrative:       EXAMINATION: XR CHEST 1 VW-       INDICATION: New admit.      COMPARISON: None.     FINDINGS: Portable chest reveals the heart to be enlarged with  underlying chronic changes seen throughout the lung fields bilaterally.  No focal parenchymal opacification is present. Degenerative change is  seen within the spine. Vascular calcifications are identified.  Calcification granuloma is identified at the left lung base.           Impression:       Heart is enlarged with chronic changes seen throughout the  lung fields bilaterally. No evidence of acute parenchymal   disease.     D:  03/24/2017  E:  03/24/2017     This report was finalized on 3/24/2017 12:50 PM by Dr. Dahiana Zavaleta MD.       MRI Brain Without Contrast [[id number]]  (Abnormal) Collected:  03/24/17 1656     Updated:  03/24/17 1804    Narrative:         EXAM:    MR Head Without Intravenous Contrast.    CLINICAL HISTORY:    82 years old, female; Signs and symptoms; Weakness, extremity; Left; Patient   HX: Lt leg weakness, trouble walking, eval for CVA vs TIA; Additional info:   Eval TIA    TECHNIQUE:    Magnetic resonance images of the head/brain without intravenous contrast in   multiple planes.    COMPARISON:    No relevant prior studies available.    FINDINGS:      Brain:  There are periventricular and subcortical areas of flair high signal   consistent with chronic small vessel ischemic disease.  No evidence of   hemorrhage or mass effect.  The cortical sulci are enlarged consistent with   cerebral atrophy.      Ventricles:  The ventricles are mildly enlarged.      Bones/joints:  Degenerative changes of the C1-2 interface.      Sinuses:  Unremarkable as visualized.      Mastoid air cells:  Unremarkable as visualized.  No mastoid effusion.      Orbits:  Unremarkable as visualized.      Impression:           No acute findings. Cerebral atrophy, chronic small vessel ischemic disease      THIS DOCUMENT HAS BEEN ELECTRONICALLY SIGNED BY HUMA VELAZQUEZ MD      Carotid duplex  · Right internal  carotid artery stenosis of 0-49%.  · Left internal carotid artery stenosis of 50-69%. Moderate distal ICA plaque.      I have reviewed the medications.    Assessment/Plan     Problem List  Principal Problem:    Left carotid stenosis  Active Problems:    Anemia    Hypothyroidism    Hypokalemia    Atrial fibrillation    CHF (congestive heart failure)    Hypertension    Hyperlipidemia    Thrombocytopathia    Carotid stenosis           Assessment/Plan:  82 yoF with hx of afib on xarelto and BB, essential hypertension, prior right carotid stenosis s/p endarterectomy, and chronic left vertebral artery stenosis. She p/w acute onset left neck pain associated with LUE paresthesia now resolved. She chronically has left sided weakness. CT angio head and neck concerning for 88 % stenosis of left carotid artery. Hx of labile BP's 60-200s.      1.Symptomatic Left carotid stenosis:  - Consult Dr. Zavala - radiology neuro interventionist.  - Currently anticoagulated on xarelto for chronic a fib, also on plavix.  - s/p right carotid endarterectomy ~1999 when had TIA.  - Statin.  - 88% occlusion per CT angio head and neck at OSH   NS consulted, Dr Edmondson reviewed imagine, no evidence of flow limiting stenosis by US, MRI is still pending, if no stroke ok for dc per NS.     2. Atrial fibrillation:  - Chronic xarelto, held upon admit  - await Baseline EKG  (can check QTc for her lexapro here too).     3. Hypertension:  - norvasc, metoprolol 12.5 BID home dose, clonidine as a PRN.     4. Hyperlipidemia:  - Statin.     5. Thrombocytopenia:  - Monitor for now, 126.     6. Chronic unspecified congestive heart failure:  - Unknown details or EF. Check ECHO.     7. Anemia:  - Macrocytic. Mild. H/H 11.4/34.  - Recheck CBC.     8. Hypothyroidism:  - New diagnosis based on elevated TSH at outlying facility - recheck TSH and add FT3 & FT4.     9. Hypokalemia:  - Replacement protocol, no CKD.  - Recheck metabolic pnl.     -PT consult, orthostatic  BP    DVT prophylaxis: Libby, matt, xarelto.     Code Status: Full code, full support, son Chuck Green is durable POA.               Discharge Planning: I expect patient to be discharged  in 1 day if MRI negative.    Casie M Mayne, PA   03/24/17   6:11 PM    Please note that portions of this note may have been completed with a voice recognition program. Efforts were made to edit the dictations, but occasionally words are mistranscribed.

## 2017-03-25 VITALS
SYSTOLIC BLOOD PRESSURE: 152 MMHG | TEMPERATURE: 97.4 F | HEIGHT: 64 IN | DIASTOLIC BLOOD PRESSURE: 108 MMHG | RESPIRATION RATE: 16 BRPM | OXYGEN SATURATION: 95 % | BODY MASS INDEX: 21.51 KG/M2 | HEART RATE: 82 BPM | WEIGHT: 126 LBS

## 2017-03-25 PROBLEM — E87.6 HYPOKALEMIA: Status: RESOLVED | Noted: 2017-03-24 | Resolved: 2017-03-25

## 2017-03-25 PROBLEM — R20.2 NUMBNESS AND TINGLING IN LEFT HAND: Status: ACTIVE | Noted: 2017-03-25

## 2017-03-25 PROBLEM — R20.0 NUMBNESS AND TINGLING IN LEFT HAND: Status: ACTIVE | Noted: 2017-03-25

## 2017-03-25 PROBLEM — R20.2 NUMBNESS AND TINGLING IN LEFT HAND: Status: RESOLVED | Noted: 2017-03-25 | Resolved: 2017-03-25

## 2017-03-25 PROBLEM — R20.0 NUMBNESS AND TINGLING IN LEFT HAND: Status: RESOLVED | Noted: 2017-03-25 | Resolved: 2017-03-25

## 2017-03-25 LAB
BASOPHILS # BLD AUTO: 0.03 10*3/MM3 (ref 0–0.2)
BASOPHILS NFR BLD AUTO: 0.4 % (ref 0–1)
DEPRECATED RDW RBC AUTO: 48 FL (ref 37–54)
EOSINOPHIL # BLD AUTO: 0.38 10*3/MM3 (ref 0.1–0.3)
EOSINOPHIL NFR BLD AUTO: 4.8 % (ref 0–3)
ERYTHROCYTE [DISTWIDTH] IN BLOOD BY AUTOMATED COUNT: 13.3 % (ref 11.3–14.5)
HCT VFR BLD AUTO: 33.4 % (ref 34.5–44)
HGB BLD-MCNC: 11.1 G/DL (ref 11.5–15.5)
IMM GRANULOCYTES # BLD: 0.03 10*3/MM3 (ref 0–0.03)
IMM GRANULOCYTES NFR BLD: 0.4 % (ref 0–0.6)
LYMPHOCYTES # BLD AUTO: 1.81 10*3/MM3 (ref 0.6–4.8)
LYMPHOCYTES NFR BLD AUTO: 22.7 % (ref 24–44)
MCH RBC QN AUTO: 33 PG (ref 27–31)
MCHC RBC AUTO-ENTMCNC: 33.2 G/DL (ref 32–36)
MCV RBC AUTO: 99.4 FL (ref 80–99)
MONOCYTES # BLD AUTO: 1.05 10*3/MM3 (ref 0–1)
MONOCYTES NFR BLD AUTO: 13.2 % (ref 0–12)
NEUTROPHILS # BLD AUTO: 4.66 10*3/MM3 (ref 1.5–8.3)
NEUTROPHILS NFR BLD AUTO: 58.5 % (ref 41–71)
PLATELET # BLD AUTO: 139 10*3/MM3 (ref 150–450)
PMV BLD AUTO: 9.5 FL (ref 6–12)
RBC # BLD AUTO: 3.36 10*6/MM3 (ref 3.89–5.14)
T3FREE SERPL-MCNC: 2.6 PG/ML (ref 2–4.4)
WBC NRBC COR # BLD: 7.96 10*3/MM3 (ref 3.5–10.8)

## 2017-03-25 PROCEDURE — 85025 COMPLETE CBC W/AUTO DIFF WBC: CPT | Performed by: PHYSICIAN ASSISTANT

## 2017-03-25 PROCEDURE — 99217 PR OBSERVATION CARE DISCHARGE MANAGEMENT: CPT | Performed by: PHYSICIAN ASSISTANT

## 2017-03-25 PROCEDURE — G0378 HOSPITAL OBSERVATION PER HR: HCPCS

## 2017-03-25 RX ADMIN — FUROSEMIDE 10 MG: 20 TABLET ORAL at 08:58

## 2017-03-25 RX ADMIN — CLOPIDOGREL BISULFATE 75 MG: 75 TABLET ORAL at 08:58

## 2017-03-25 RX ADMIN — RIVAROXABAN 20 MG: 20 TABLET, FILM COATED ORAL at 08:58

## 2017-03-25 RX ADMIN — ESCITALOPRAM OXALATE 20 MG: 20 TABLET ORAL at 08:57

## 2017-03-25 RX ADMIN — GABAPENTIN 300 MG: 300 CAPSULE ORAL at 06:12

## 2017-03-25 RX ADMIN — AMLODIPINE BESYLATE 5 MG: 5 TABLET ORAL at 08:58

## 2017-03-25 RX ADMIN — METOPROLOL TARTRATE 12.5 MG: 25 TABLET, FILM COATED ORAL at 08:58

## 2017-03-25 RX ADMIN — FAMOTIDINE 40 MG: 20 TABLET ORAL at 08:57

## 2017-03-25 NOTE — PROGRESS NOTES
I reviewed the MRI that was obtained it showed no evidence of cerebrovascular ischemia.  Her blood pressures in her bilateral upper extremities are normal.  Her carotid ultrasound is not consistent with high-grade stenosis.    From a neurosurgical standpoint she may be discharged home from the hospital to follow-up with her primary care doctor and/or neurology please call with questions or concerns.  There is no role for surgical intervention and/or angiography at this time.

## 2017-03-25 NOTE — DISCHARGE SUMMARY
[facility] Medicine Services  DISCHARGE SUMMARY       Date of Admission: 3/24/2017  Date of Discharge:  3/25/2017  Primary Care Physician: Zeeshan Ross MD    Discharge Diagnoses:  Active Hospital Problems (** Indicates Principal Problem)    Diagnosis Date Noted   • **Left carotid stenosis [I65.22] 03/24/2017   • Anemia [D64.9] 03/24/2017   • Hypothyroidism [E03.9] 03/24/2017   • Atrial fibrillation [I48.91] 03/24/2017   • CHF (congestive heart failure) [I50.9] 03/24/2017   • Hypertension [I10] 03/24/2017   • Hyperlipidemia [E78.5] 03/24/2017   • Thrombocytopathia [D69.1] 03/24/2017   • Carotid stenosis [I65.29] 03/24/2017      Resolved Hospital Problems    Diagnosis Date Noted Date Resolved   • Numbness and tingling in left hand [R20.2] 03/25/2017 03/25/2017   • Hypokalemia [E87.6] 03/24/2017 03/25/2017       Presenting Problem/History of Present Illness  Left carotid stenosis [I65.22]  Carotid stenosis [I65.29]     History of Present Illness on Day of Discharge:   Pt lying in bed resting, smiling, no family present at time of visit. Pt reports feeling well, neck pain and left hand numbness/tingling resolved. Pt has no new complaints. Denies HA, dizziness, SOA, orthopnea, CP, palpitations, N/V. Pt would like to go home. Call pt's son at home, discussed results of MRI and Dr Edmondson's recommendation to followup in 6mos as outpatient, no medication changes.     Hospital Course  Patient is a 82 y.o. female with hx of afib on xarelto and BB, essential hypertension, prior right carotid stenosis s/p endarterectomy, and chronic left vertebral artery stenosis. She p/w acute onset left neck pain associated with LUE paresthesia now resolved. She chronically has left sided weakness. CT angio head and neck concerning for 88 % stenosis of left carotid artery. Hx of labile BP's 60-200s.  Patient was admitted to the hospitalist group for further evaluation and management.  Upon admission neurosurgery  was consulted, Dr. Edmondson evaluated patient recommending MRI.  Results of MRI were negative for acute stroke no acute findings.  Dr. Edmondson also reviewed images from outside hospital noting no evidence of flow-limiting stenosis when Doppler of carotids was performed at [facility].  Per Dr. Edmondson's recommendations patient is okay to discharge home from a neurosurgery standpoint given MRI negative for stroke.  Spell of and known etiology not felt to be TIA or related to left-sided carotid stenosis per neurosurgery.  Patient is to follow-up in his office in 6 months for further monitoring and management of noncritical carotid stenosis.  Plan at that time is to repeat duplex sonography.  Patient is to continue Xarelto on Plavix as previously taken.  No medication changes made.    Patient states she is followed by a cardiologist in [place] for A. fib and VHD. ECHO performed during course of stay showed Left ventricular function is normal. Estimated EF = 60%., Moderate to severe aortic valve stenosis is present, Mild mitral valve regurgitation is present, Moderate tricuspid valve regurgitation is present.    Patient is clinically improved, all symptoms have resolved.  Patient is now back to baseline.  She is now medically appropriate to discharge home with family.  She is to follow-up with primary care within 1-2 weeks of discharge.  She is to follow-up as stated above with Dr. Edmondson in 6 months.      Procedures Performed  none       Consults:   Consults     Date and Time Order Name Status Description    3/24/2017 0614 Inpatient Consult to Neurosurgery Completed           Pertinent Test Results:  Imaging Results (most recent)     Procedure Component Value Units Date/Time    XR Chest 1 View [[id number]] Collected:  03/24/17 0941     Updated:  03/24/17 1252    Narrative:       EXAMINATION: XR CHEST 1 VW-      INDICATION: New admit.      COMPARISON: None.     FINDINGS: Portable chest reveals the heart to be  enlarged with  underlying chronic changes seen throughout the lung fields bilaterally.  No focal parenchymal opacification is present. Degenerative change is  seen within the spine. Vascular calcifications are identified.  Calcification granuloma is identified at the left lung base.           Impression:       Heart is enlarged with chronic changes seen throughout the  lung fields bilaterally. No evidence of acute parenchymal   disease.     D:  03/24/2017  E:  03/24/2017     This report was finalized on 3/24/2017 12:50 PM by Dr. Dahiana Zavaleta MD.       MRI Brain Without Contrast [[id number]]  (Abnormal) Collected:  03/24/17 1656     Updated:  03/24/17 1804    Narrative:         EXAM:    MR Head Without Intravenous Contrast.    CLINICAL HISTORY:    82 years old, female; Signs and symptoms; Weakness, extremity; Left; Patient   HX: Lt leg weakness, trouble walking, eval for CVA vs TIA; Additional info:   Eval TIA    TECHNIQUE:    Magnetic resonance images of the head/brain without intravenous contrast in   multiple planes.    COMPARISON:    No relevant prior studies available.    FINDINGS:      Brain:  There are periventricular and subcortical areas of flair high signal   consistent with chronic small vessel ischemic disease.  No evidence of   hemorrhage or mass effect.  The cortical sulci are enlarged consistent with   cerebral atrophy.      Ventricles:  The ventricles are mildly enlarged.      Bones/joints:  Degenerative changes of the C1-2 interface.      Sinuses:  Unremarkable as visualized.      Mastoid air cells:  Unremarkable as visualized.  No mastoid effusion.      Orbits:  Unremarkable as visualized.      Impression:           No acute findings. Cerebral atrophy, chronic small vessel ischemic disease      THIS DOCUMENT HAS BEEN ELECTRONICALLY SIGNED BY HUMA VELAZQUEZ MD        Lab Results (most recent)     Procedure Component Value Units Date/Time    CBC Auto Differential [[id number]]  (Abnormal) Collected:   03/24/17 0650    Specimen:  Blood Updated:  03/24/17 0734     WBC 5.52 10*3/mm3      RBC 3.32 (L) 10*6/mm3      Hemoglobin 11.1 (L) g/dL      Hematocrit 33.9 (L) %      .1 (H) fL      MCH 33.4 (H) pg      MCHC 32.7 g/dL      RDW 13.4 %      RDW-SD 50.4 fl      MPV 9.5 fL      Platelets 120 (L) 10*3/mm3      Neutrophil % 53.6 %      Lymphocyte % 28.8 %      Monocyte % 12.1 (H) %      Eosinophil % 4.9 (H) %      Basophil % 0.4 %      Immature Grans % 0.2 %      Neutrophils, Absolute 2.96 10*3/mm3      Lymphocytes, Absolute 1.59 10*3/mm3      Monocytes, Absolute 0.67 10*3/mm3      Eosinophils, Absolute 0.27 10*3/mm3      Basophils, Absolute 0.02 10*3/mm3      Immature Grans, Absolute 0.01 10*3/mm3     aPTT [[id number]]  (Normal) Collected:  03/24/17 0650    Specimen:  Blood Updated:  03/24/17 0745     PTT 29.2 seconds     Narrative:       PTT = The equivalent PTT values for the therapeutic range of heparin levels at 0.3 to 0.5 U/ml are 45 to 60 seconds.    Protime-INR [[id number]]  (Abnormal) Collected:  03/24/17 0650    Specimen:  Blood Updated:  03/24/17 0745     Protime 12.3 (H) Seconds      INR 1.12    Narrative:       Therapeutic Ranges for INR: 2.0-3.0 (PT 20-30)                              2.5-3.5 (PT 25-34)    TSH [[id number]]  (Normal) Collected:  03/24/17 0650    Specimen:  Blood Updated:  03/24/17 0801     TSH 1.936 mIU/mL     Magnesium [[id number]]  (Normal) Collected:  03/24/17 0650    Specimen:  Blood Updated:  03/24/17 0801     Magnesium 1.9 mg/dL     Phosphorus [[id number]]  (Normal) Collected:  03/24/17 0650    Specimen:  Blood Updated:  03/24/17 0801     Phosphorus 3.9 mg/dL     T4, Free [[id number]]  (Normal) Collected:  03/24/17 0650    Specimen:  Blood Updated:  03/24/17 0801     Free T4 1.03 ng/dL     Comprehensive Metabolic Panel [[id number]]  (Abnormal) Collected:  03/24/17 0650    Specimen:  Blood Updated:  03/24/17 0801     Glucose 83 mg/dL      BUN 12 mg/dL      Creatinine 0.70 mg/dL      Sodium  138 mmol/L      Potassium 3.5 mmol/L      Chloride 105 mmol/L      CO2 30.0 mmol/L      Calcium 8.9 mg/dL      Total Protein 6.4 g/dL      Albumin 3.70 g/dL      ALT (SGPT) 14 U/L      AST (SGOT) 19 U/L      Alkaline Phosphatase 56 U/L      Total Bilirubin 0.4 mg/dL      eGFR Non African Amer 80 mL/min/1.73      Globulin 2.7 gm/dL      A/G Ratio 1.4 (L) g/dL      BUN/Creatinine Ratio 17.1     Anion Gap 3.0 mmol/L     Narrative:       Troponin [[id number]]  (Normal) Collected:  03/24/17 0650    Specimen:  Blood Updated:  03/24/17 0801     Troponin I <0.006 ng/mL     Narrative:       Ultra Troponin I Reference Range:    <=0.039 ng/mL: Negative  0.04-0.779 ng/mL: Indeterminate Range. Clinical correlation required.  >=0.78  ng/mL: Consistent with myocardial injury. Clinical correlation required.    Lipid Panel [[id number]]  (Abnormal) Collected:  03/24/17 0650    Specimen:  Blood Updated:  03/24/17 0801     Total Cholesterol 108 mg/dL      Triglycerides 69 mg/dL      HDL Cholesterol 33 (L) mg/dL      LDL Cholesterol  56 mg/dL     Narrative:       Hemoglobin A1c [[id number]]  (Normal) Collected:  03/24/17 0650    Specimen:  Blood Updated:  03/24/17 0820     Hemoglobin A1C 5.30 %     Narrative:       The American Diabetes Association recommends maintenance of Hemoglobin A1C at 7.0% or lower. Goals for Hemoglobin A1C reduction may need to be modified if hypoglycemia is a problem.    CBC & Differential [[id number]] Collected:  03/25/17 0430    Specimen:  Blood Updated:  03/25/17 0544    Narrative:       CBC Auto Differential [[id number]]  (Abnormal) Collected:  03/25/17 0430    Specimen:  Blood Updated:  03/25/17 0544     WBC 7.96 10*3/mm3      RBC 3.36 (L) 10*6/mm3      Hemoglobin 11.1 (L) g/dL      Hematocrit 33.4 (L) %      MCV 99.4 (H) fL      MCH 33.0 (H) pg      MCHC 33.2 g/dL      RDW 13.3 %      RDW-SD 48.0 fl      MPV 9.5 fL      Platelets 139 (L) 10*3/mm3      Neutrophil % 58.5 %      Lymphocyte % 22.7 (L) %      Monocyte %  "13.2 (H) %      Eosinophil % 4.8 (H) %      Basophil % 0.4 %      Immature Grans % 0.4 %      Neutrophils, Absolute 4.66 10*3/mm3      Lymphocytes, Absolute 1.81 10*3/mm3      Monocytes, Absolute 1.05 (H) 10*3/mm3      Eosinophils, Absolute 0.38 (H) 10*3/mm3      Basophils, Absolute 0.03 10*3/mm3      Immature Grans, Absolute 0.03 10*3/mm3     T3, Free [[id number]] Collected:  03/24/17 0650    Specimen:  Blood Updated:  03/25/17 0929     T3, Free 2.6 pg/mL     Narrative:       Performed at:  [address]  [id number]  : Yobany Jackson PhD, Phone:  [phone]        Condition on Discharge:  stable    Physical Exam on Discharge:BP (!) 152/108  Pulse 82  Temp 97.4 °F (36.3 °C) (Oral)   Resp 16  Ht 64\" (162.6 cm)  Wt 126 lb (57.2 kg)  SpO2 95%  BMI 21.63 kg/m2  Physical Exam  Constitutional:very thin framed, resting in bed appears in  no acute distress, awake, alert  Eyes:sclerae anicteric, no conjunctival injection  Neck: supple, trachea midline  Respiratory: Clear to auscultation bilaterally, nonlabored respirations   Cardiovascular: irregularly irregular, systolic murmurs III/VI, no rubs, or gallops, palpable pedal pulses bilaterally  Gastrointestinal: Positive bowel sounds, soft, nontender, nondistended  Musculoskeletal: No bilateral ankle edema, no clubbing or cyanosis to bilateral lower extremities  Psychiatric: oriented x 3, appropriate affect, cooperative  Neurologic: muscle strength 5/5 right UE/LE, 4/5 LUE/3/5LLE , follows commands, speech is clear       Discharge Disposition  Home or Self Care    Discharge Medications   Emily Green   Home Medication Instructions JEFFREY:[id number]    Printed on:03/25/17 0929   Medication Information                      alendronate (FOSAMAX) 70 MG tablet  Take 70 mg by mouth Every 7 (Seven) Days. Takes on Sundays             AMLODIPINE BESYLATE PO  Take 5 mg by mouth Daily.             atorvastatin (LIPITOR) 20 MG " tablet  Take 20 mg by mouth Daily.             clopidogrel (PLAVIX) 75 MG tablet  Take 75 mg by mouth Daily.             escitalopram (LEXAPRO) 20 MG tablet  Take 20 mg by mouth Daily.             famotidine (PEPCID) 40 MG tablet  Take 40 mg by mouth Daily.             furosemide (LASIX) 20 MG tablet  Take 10 mg by mouth Daily.             gabapentin (NEURONTIN) 300 MG capsule  Take 300 mg by mouth Every Morning.             gabapentin (NEURONTIN) 600 MG tablet  Take 600 mg by mouth Every Night.             HYDROcodone-acetaminophen (NORCO)  MG per tablet  Take 2 tablets by mouth Daily.             meclizine (ANTIVERT) 25 MG tablet  Take 25 mg by mouth 3 (Three) Times a Day As Needed for dizziness.             metoprolol tartrate (LOPRESSOR) 25 MG tablet  Take 12.5 mg by mouth 2 (Two) Times a Day.             rivaroxaban (XARELTO) 20 MG tablet  Take 20 mg by mouth Daily.                 Discharge Diet:   Diet Instructions     Advance Diet As Tolerated                     Discharge Care Plan / Instructions:    Activity at Discharge:   Activity Instructions     Activity as Tolerated                     Follow-up Appointments  No future appointments.  Additional Instructions for the Follow-ups that You Need to Schedule     Discharge Follow-Up With Specified Provider    As directed    To:  Dr Edmondson, Neurosurgery   Follow Up:  6 Months   Follow Up Details:  Needs appointment made with Dr Edmondson, for her carotid disease in 6 months with repeated duplex sonography.       Discharge Follow-up with PCP    As directed    Follow Up Details:  needs followup with PCP within 1-2 weeks of discharge                 Test Results Pending at Discharge       Casie M Mayne, PA 03/25/17 9:29 AM    Time: Discharge 40 min    Please note that portions of this note may have been completed with a voice recognition program. Efforts were made to edit the dictations, but occasionally words are mistranscribed.

## 2017-03-25 NOTE — PLAN OF CARE
Problem: Patient Care Overview (Adult)  Goal: Plan of Care Review  Outcome: Ongoing (interventions implemented as appropriate)    03/25/17 0017   Coping/Psychosocial Response Interventions   Plan Of Care Reviewed With patient   Patient Care Overview   Progress progress toward functional goals as expected         Problem: Activity Intolerance (Adult)  Goal: Activity Tolerance  Outcome: Ongoing (interventions implemented as appropriate)    03/25/17 0017   Activity Intolerance (Adult)   Activity Tolerance making progress toward outcome

## 2017-03-25 NOTE — PLAN OF CARE
Problem: Activity Intolerance (Adult)  Goal: Identify Related Risk Factors and Signs and Symptoms  Outcome: Ongoing (interventions implemented as appropriate)  Goal: Activity Tolerance  Outcome: Ongoing (interventions implemented as appropriate)  Goal: Effective Energy Conservation Techniques  Outcome: Ongoing (interventions implemented as appropriate)

## 2022-03-23 ENCOUNTER — TELEMEDICINE (OUTPATIENT)
Dept: FAMILY MEDICINE CLINIC | Facility: CLINIC | Age: 87
End: 2022-03-23

## 2022-03-23 VITALS
HEIGHT: 64 IN | RESPIRATION RATE: 16 BRPM | WEIGHT: 110 LBS | TEMPERATURE: 97.6 F | SYSTOLIC BLOOD PRESSURE: 119 MMHG | DIASTOLIC BLOOD PRESSURE: 56 MMHG | HEART RATE: 68 BPM | BODY MASS INDEX: 18.78 KG/M2

## 2022-03-23 DIAGNOSIS — R29.90: ICD-10-CM

## 2022-03-23 DIAGNOSIS — N18.4 CKD (CHRONIC KIDNEY DISEASE) STAGE 4, GFR 15-29 ML/MIN: Primary | ICD-10-CM

## 2022-03-23 DIAGNOSIS — R63.6 UNDERWEIGHT: ICD-10-CM

## 2022-03-23 DIAGNOSIS — G89.4 CHRONIC PAIN SYNDROME: ICD-10-CM

## 2022-03-23 PROCEDURE — 99213 OFFICE O/P EST LOW 20 MIN: CPT | Performed by: FAMILY MEDICINE

## 2022-03-23 RX ORDER — FUROSEMIDE 20 MG/1
20 TABLET ORAL EVERY 12 HOURS
Qty: 60 TABLET | Refills: 11 | Status: SHIPPED | OUTPATIENT
Start: 2022-03-23 | End: 2023-02-27

## 2022-03-23 RX ORDER — HYDROCODONE BITARTRATE AND ACETAMINOPHEN 7.5; 325 MG/1; MG/1
1 TABLET ORAL EVERY 8 HOURS
COMMUNITY
Start: 2022-02-19 | End: 2022-03-23

## 2022-03-23 RX ORDER — FAMOTIDINE 40 MG/1
1 TABLET, FILM COATED ORAL
COMMUNITY
Start: 2021-11-02 | End: 2022-06-15 | Stop reason: SDUPTHER

## 2022-03-23 RX ORDER — FUROSEMIDE 20 MG/1
1 TABLET ORAL EVERY 12 HOURS
COMMUNITY
Start: 2021-11-02 | End: 2022-03-23

## 2022-03-23 RX ORDER — MECLIZINE HYDROCHLORIDE 25 MG/1
25 TABLET ORAL 3 TIMES DAILY PRN
Qty: 90 TABLET | Refills: 11 | Status: SHIPPED | OUTPATIENT
Start: 2022-03-23 | End: 2022-06-15 | Stop reason: SDUPTHER

## 2022-03-23 RX ORDER — ZINC SULFATE 50(220)MG
CAPSULE ORAL
COMMUNITY

## 2022-03-23 RX ORDER — ATORVASTATIN CALCIUM 20 MG/1
20 TABLET, FILM COATED ORAL DAILY
Qty: 30 TABLET | Refills: 11 | Status: SHIPPED | OUTPATIENT
Start: 2022-03-23 | End: 2023-02-27

## 2022-03-23 RX ORDER — PANTOPRAZOLE SODIUM 40 MG/1
1 TABLET, DELAYED RELEASE ORAL DAILY
COMMUNITY
Start: 2021-11-02 | End: 2022-06-15 | Stop reason: SDUPTHER

## 2022-03-23 RX ORDER — POTASSIUM CHLORIDE 20 MEQ/1
1 TABLET, EXTENDED RELEASE ORAL DAILY
COMMUNITY
Start: 2021-11-02 | End: 2022-09-15 | Stop reason: SDUPTHER

## 2022-03-23 RX ORDER — MECLIZINE HYDROCHLORIDE 25 MG/1
1 TABLET ORAL EVERY 8 HOURS
COMMUNITY
Start: 2021-11-02 | End: 2022-06-15 | Stop reason: SDUPTHER

## 2022-03-23 RX ORDER — CARVEDILOL 3.12 MG/1
1 TABLET ORAL 2 TIMES DAILY WITH MEALS
COMMUNITY
Start: 2021-11-02 | End: 2022-03-23

## 2022-03-23 RX ORDER — SPIRONOLACTONE 25 MG/1
1 TABLET ORAL DAILY
COMMUNITY
Start: 2021-11-02 | End: 2022-06-15 | Stop reason: SDUPTHER

## 2022-03-23 RX ORDER — HYDROCODONE BITARTRATE AND ACETAMINOPHEN 7.5; 325 MG/1; MG/1
1 TABLET ORAL NIGHTLY PRN
Qty: 30 TABLET | Refills: 0 | Status: SHIPPED | OUTPATIENT
Start: 2022-03-23 | End: 2022-04-18 | Stop reason: SDUPTHER

## 2022-03-23 RX ORDER — CARVEDILOL 3.12 MG/1
3.12 TABLET ORAL 2 TIMES DAILY WITH MEALS
Qty: 60 TABLET | Refills: 11 | Status: SHIPPED | OUTPATIENT
Start: 2022-03-23 | End: 2022-11-14

## 2022-03-23 RX ORDER — ALENDRONATE SODIUM 70 MG/1
1 TABLET ORAL WEEKLY
COMMUNITY
Start: 2021-09-25 | End: 2022-05-31 | Stop reason: SDUPTHER

## 2022-03-23 RX ORDER — ATORVASTATIN CALCIUM 20 MG/1
1 TABLET, FILM COATED ORAL DAILY
COMMUNITY
Start: 2021-11-02 | End: 2022-03-23

## 2022-03-23 RX ORDER — GABAPENTIN 400 MG/1
1 CAPSULE ORAL EVERY 8 HOURS
COMMUNITY
Start: 2022-02-19 | End: 2022-04-18 | Stop reason: SDUPTHER

## 2022-03-23 NOTE — PROGRESS NOTES
New Patient Office Visit      Patient Name: Emily Green  : 1935   MRN: [id number]     Chief Complaint:  No chief complaint on file.      History of Present Illness: Emily Green is a 87 y.o. female who is here today for follow up    Subjective      Review of Systems:   Review of Systems     Past Medical History:   Past Medical History:   Diagnosis Date   • Allergy     SEASONAL   • Anemia 2017   • Anxiety    • Arthritis    • Atrial fibrillation ([facility]) 2017    CHRONIC   • Auditory effects     DECREASED AUDITORY ACUITY   • C. difficile colitis    • Cardiomegaly    • Carotid artery stenosis    • Carpal tunnel syndrome    • CHF (congestive heart failure) ([facility]) 2017   • Chronic diarrhea    • Chronic neck pain    • Chronic obstructive lung disease ([facility])    • CVA (cerebral vascular accident) ([facility])    • Degenerative arthritis of hip     left, not a candidate for operation   • Dementia ([facility])    • Dementia ([facility])    • Depression    • Dizziness    • Dysrhythmias     CARDIAC   • Early cataract    • Fatigue     CHRONIC   • Gastroesophageal reflux disease without esophagitis    • Headache    • Hereditary sensory neuropathy    • High cholesterol    • High risk medication use    • History of cerebrovascular accident    • History of pulmonary embolus (PE)    • Hyperlipidemia 2017   • Hypertension 2017    ESSENTIAL   • Hypokalemia 2017   • Hypothyroidism 2017    acquired   • Left carotid stenosis 2017   • Long term current use of anticoagulant    • Neuropathy    • Osteoporosis    • Paroxysmal atrial fibrillation ([facility])    • Peripheral vascular disease ([facility])    • Posterior circulation stroke ([facility])     WITH VERTIGO   • Pulmonary hypertension ([facility])    • Recurrent major depression in partial remission ([facility])    • Seizures ([facility])     Thinks may have hx seizures   • 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   • Thyroid disease    • TIA (transient ischemic attack)     history   • Vertebral  artery occlusion, left    • Vertigo        Past Surgical History:   Past Surgical History:   Procedure Laterality Date   • ANGIOPLASTY  2011    OF ARTERY OF LOWER EXTREMITY   • APPENDECTOMY     • CAROTID ENDARTERECTOMY Right     ~1999 deborah   • HEMORRHOIDECTOMY     • HYSTERECTOMY     • OTHER SURGICAL HISTORY  11/2017    TRANSCATHETER AORTIC VALVE REPLACEMENT   • OTHER SURGICAL HISTORY  2004    BLOOD TRANSFUSION   • REPLACEMENT TOTAL HIP LATERAL POSITION Right     UNKNOWN DETAILS 2008, 2004   • TOTAL ABDOMINAL HYSTERECTOMY WITH SALPINGO OOPHORECTOMY      AT AGE 38       Family History:   Family History   Problem Relation Age of Onset   • High cholesterol Mother    • Osteoporosis Mother    • Stroke Mother    • Other Mother         CARDIOVASCULAR DISEASE   • Hearing loss Mother    • Heart disease Mother    • Hypertension Mother    • Arthritis Mother    • Other Father         CARDIOVASCULAR DISEASE   • Heart disease Father    • Hypertension Father    • Arthritis Father    • High cholesterol Father    • Osteoporosis Father    • Hearing loss Father    • Irritable bowel syndrome Father    • Stroke Father    • Hearing loss Sister    • Hypertension Sister    • High cholesterol Sister    • Osteoporosis Sister    • Arthritis Brother    • Other Brother         CARDIOVASCULAR DISEASE   • Kidney nephrosis Daughter    • Hypertension Other    • Cancer Other         GASTRIC   • Cancer Other         COLON CANCER       Social History:   Social History     Socioeconomic History   • Marital status:    Tobacco Use   • Smoking status: Never Smoker   • Smokeless tobacco: Never Used   Substance and Sexual Activity   • Alcohol use: Never   • Drug use: Never   • Sexual activity: Never     Partners: Female       Medications:     Current Outpatient Medications:   •  alendronate (FOSAMAX) 70 MG tablet, Take 70 mg by mouth Every 7 (Seven) Days. Takes on Sundays, Disp: , Rfl:   •  ambrisentan (LETAIRIS) 10 MG tablet, ambrisentan 10 mg tablet,  Disp: , Rfl:   •  AMLODIPINE BESYLATE PO, Take 5 mg by mouth Daily., Disp: , Rfl:   •  Ascorbic Acid 500 MG chewable tablet, Strawberry C 500 mg chewable tablet  TAKE 1 CAPLET BY MOUTH TWICE A DAY. OTC NOT COVERED., Disp: , Rfl:   •  atorvastatin (LIPITOR) 20 MG tablet, Take 20 mg by mouth Daily., Disp: , Rfl:   •  benzonatate (TESSALON) 100 MG capsule, benzonatate 100 mg capsule  TAKE 1 CAPSULE BY MOUTH THREE TIMES A DAY AS NEEDED FOR COUGH. NOT COVERED, Disp: , Rfl:   •  carvedilol (COREG) 3.125 MG tablet, Take 3.125 mg by mouth 2 (Two) Times a Day With Meals., Disp: , Rfl:   •  Cholecalciferol 50 MCG (2000 UT) capsule, Vitamin D3 50 mcg (2,000 unit) capsule  Daily, Disp: , Rfl:   •  clopidogrel (PLAVIX) 75 MG tablet, Take 75 mg by mouth Daily., Disp: , Rfl:   •  dexamethasone (DECADRON) 4 MG tablet, dexamethasone 4 mg tablet  TAKE 1 AND 1/2 TABLETS BY MOUTH EVERY DAY, Disp: , Rfl:   •  Eliquis 5 MG tablet tablet, Take 5 mg by mouth 2 (Two) Times a Day., Disp: , Rfl:   •  escitalopram (LEXAPRO) 20 MG tablet, Take 20 mg by mouth Daily., Disp: , Rfl:   •  famotidine (PEPCID) 40 MG tablet, Take 40 mg by mouth Daily., Disp: , Rfl:   •  ferrous sulfate 325 (65 FE) MG tablet, ferrous sulfate 325 mg (65 mg iron) tablet  TAKE 1 TABLET BY MOUTH THREE TIMES A DAY, Disp: , Rfl:   •  furosemide (LASIX) 20 MG tablet, Take 10 mg by mouth Daily., Disp: , Rfl:   •  furosemide (LASIX) 40 MG tablet, furosemide 40 mg tablet, Disp: , Rfl:   •  gabapentin (NEURONTIN) 300 MG capsule, Take 300 mg by mouth Every Morning., Disp: , Rfl:   •  gabapentin (NEURONTIN) 400 MG capsule, Take 400 mg by mouth 3 (Three) Times a Day., Disp: , Rfl:   •  gabapentin (NEURONTIN) 600 MG tablet, Take 600 mg by mouth Every Night., Disp: , Rfl:   •  guaiFENesin (Mucinex) 600 MG 12 hr tablet, Mucinex 600 mg tablet, extended release  TAKE 1 TABLET BY MOUTH EVERY 12 HOURS AS NEEDED, Disp: , Rfl:   •  HYDROcodone-acetaminophen (NORCO)  MG per tablet, Take  2 tablets by mouth Daily., Disp: , Rfl:   •  HYDROcodone-acetaminophen (NORCO) 5-325 MG per tablet, hydrocodone 5 mg-acetaminophen 325 mg tablet  TAKE 1 TABLET BY MOUTH EVERY 8 HOURS AS NEEDED FOR PAIN, Disp: , Rfl:   •  Influenza Vac High-Dose Quad (Fluzone High-Dose Quadrivalent) 0.7 ML suspension prefilled syringe injection, Fluzone High-Dose Quad 2020-21 (PF) 240 mcg/0.7 mL IM syringe  PHARMACY ADMINISTERED, Disp: , Rfl:   •  Lactobacillus-Inulin (Culturelle Adult Ult Balance) capsule, Probiotic Formula 10 billion cell(2 billion ea) capsule, Disp: , Rfl:   •  lisinopril (PRINIVIL,ZESTRIL) 20 MG tablet, lisinopril 20 mg tablet  TAKE 1 TABLET BY MOUTH EVERY DAY, Disp: , Rfl:   •  meclizine (ANTIVERT) 25 MG tablet, Take 25 mg by mouth 3 (Three) Times a Day As Needed for dizziness., Disp: , Rfl:   •  metoprolol tartrate (LOPRESSOR) 25 MG tablet, Take 12.5 mg by mouth 2 (Two) Times a Day., Disp: , Rfl:   •  nitroglycerin (NITROSTAT) 0.4 MG SL tablet, nitroglycerin 0.4 mg sublingual tablet, Disp: , Rfl:   •  pantoprazole (PROTONIX) 40 MG EC tablet, pantoprazole 40 mg tablet,delayed release  TAKE 1 TABLET BY MOUTH EVERY DAY, Disp: , Rfl:   •  potassium chloride ER (K-TAB) 20 MEQ tablet controlled-release ER tablet, Take 20 mEq by mouth Daily. with food, Disp: , Rfl:   •  rivaroxaban (XARELTO) 20 MG tablet, Take 20 mg by mouth Daily., Disp: , Rfl:   •  sildenafil (REVATIO) 20 MG tablet, TAKE 1 TABLET (20 MG) BY MOUTH 3 TIMES PER DAY, Disp: , Rfl:   •  spironolactone (ALDACTONE) 25 MG tablet, Take 25 mg by mouth Daily., Disp: , Rfl:     Allergies:   Allergies   Allergen Reactions   • Codeine Nausea And Vomiting   • Nitrofurantoin    • Nitrofurantoin Macrocrystal Nausea And Vomiting       Objective     Physical Exam:  Vital Signs: There were no vitals filed for this visit.  There is no height or weight on file to calculate BMI.     Physical Exam    Assessment / Plan      Assessment/Plan:   There are no diagnoses linked to  this encounter.     1. ***      Follow Up:   No follow-ups on file.    Zeeshan Ross MD  [facility]

## 2022-03-23 NOTE — PROGRESS NOTES
Telehealth E-Visit      Patient Name: Emily Green  : 1935   MRN: [id number]     Chief Complaint:    Chief Complaint   Patient presents with   • Weight Loss     Pt doing a virtual visit for follow up        I have reviewed the E-Visit questionnaire and the patient's answers, my assessment and plan are listed below.   Unable to complete visit using a video connection to the patient. A phone visit was used to complete this visits. Total time of discussion was 15 minutes.    History of Present Illness: Emily Green is a 87 y.o. female who is here today to follow up for pain medicine      Subjective      Review of Systems:   Review of Systems   Constitutional: Positive for fatigue.   Respiratory: Negative for chest tightness and shortness of breath.    Cardiovascular: Negative for chest pain.   Gastrointestinal: Negative for abdominal pain.       I have reviewed and the following portions of the patient's history were updated as appropriate: past family history, past medical history, past social history, past surgical history and problem list.    Medications:     Current Outpatient Medications:   •  alendronate (FOSAMAX) 70 MG tablet, Take 70 mg by mouth Every 7 (Seven) Days. Takes on Sundays, Disp: , Rfl:   •  alendronate (FOSAMAX) 70 MG tablet, Take 1 tablet by mouth 1 (One) Time Per Week., Disp: , Rfl:   •  ambrisentan (LETAIRIS) 10 MG tablet, ambrisentan 10 mg tablet, Disp: , Rfl:   •  AMLODIPINE BESYLATE PO, Take 5 mg by mouth Daily., Disp: , Rfl:   •  apixaban (ELIQUIS) 5 MG tablet tablet, Take 1 tablet by mouth 2 (Two) Times a Day., Disp: , Rfl:   •  Ascorbic Acid 500 MG chewable tablet, Strawberry C 500 mg chewable tablet  TAKE 1 CAPLET BY MOUTH TWICE A DAY. OTC NOT COVERED., Disp: , Rfl:   •  atorvastatin (LIPITOR) 20 MG tablet, Take 20 mg by mouth Daily., Disp: , Rfl:   •  atorvastatin (LIPITOR) 20 MG tablet, Take 1 tablet by mouth Daily., Disp: , Rfl:   •  benzonatate (TESSALON) 100 MG capsule,  benzonatate 100 mg capsule  TAKE 1 CAPSULE BY MOUTH THREE TIMES A DAY AS NEEDED FOR COUGH. NOT COVERED, Disp: , Rfl:   •  carvedilol (COREG) 3.125 MG tablet, Take 3.125 mg by mouth 2 (Two) Times a Day With Meals., Disp: , Rfl:   •  carvedilol (COREG) 3.125 MG tablet, Take 1 tablet by mouth 2 (Two) Times a Day With Meals., Disp: , Rfl:   •  Cholecalciferol 50 MCG (2000 UT) capsule, Vitamin D3 50 mcg (2,000 unit) capsule  Daily, Disp: , Rfl:   •  clopidogrel (PLAVIX) 75 MG tablet, Take 75 mg by mouth Daily., Disp: , Rfl:   •  dexamethasone (DECADRON) 4 MG tablet, dexamethasone 4 mg tablet  TAKE 1 AND 1/2 TABLETS BY MOUTH EVERY DAY, Disp: , Rfl:   •  Eliquis 5 MG tablet tablet, Take 5 mg by mouth 2 (Two) Times a Day., Disp: , Rfl:   •  escitalopram (LEXAPRO) 20 MG tablet, Take 20 mg by mouth Daily., Disp: , Rfl:   •  famotidine (PEPCID) 40 MG tablet, Take 40 mg by mouth Daily., Disp: , Rfl:   •  famotidine (PEPCID) 40 MG tablet, Take 1 tablet by mouth every night at bedtime., Disp: , Rfl:   •  ferrous sulfate 325 (65 FE) MG tablet, ferrous sulfate 325 mg (65 mg iron) tablet  TAKE 1 TABLET BY MOUTH THREE TIMES A DAY, Disp: , Rfl:   •  furosemide (LASIX) 20 MG tablet, Take 10 mg by mouth Daily., Disp: , Rfl:   •  furosemide (LASIX) 20 MG tablet, Take 1 tablet by mouth Every 12 (Twelve) Hours., Disp: , Rfl:   •  furosemide (LASIX) 40 MG tablet, furosemide 40 mg tablet, Disp: , Rfl:   •  gabapentin (NEURONTIN) 300 MG capsule, Take 300 mg by mouth Every Morning., Disp: , Rfl:   •  gabapentin (NEURONTIN) 400 MG capsule, Take 400 mg by mouth 3 (Three) Times a Day., Disp: , Rfl:   •  gabapentin (NEURONTIN) 400 MG capsule, Take 1 capsule by mouth Every 8 (Eight) Hours., Disp: , Rfl:   •  gabapentin (NEURONTIN) 600 MG tablet, Take 600 mg by mouth Every Night., Disp: , Rfl:   •  guaiFENesin (MUCINEX) 600 MG 12 hr tablet, Mucinex 600 mg tablet, extended release  TAKE 1 TABLET BY MOUTH EVERY 12 HOURS AS NEEDED, Disp: , Rfl:   •   HYDROcodone-acetaminophen (NORCO)  MG per tablet, Take 2 tablets by mouth Daily., Disp: , Rfl:   •  HYDROcodone-acetaminophen (NORCO) 7.5-325 MG per tablet, Take 1 tablet by mouth Every 8 (Eight) Hours., Disp: , Rfl:   •  Lactobacillus-Inulin (Culturelle Adult Ult Balance) capsule, Probiotic Formula 10 billion cell(2 billion ea) capsule, Disp: , Rfl:   •  lisinopril (PRINIVIL,ZESTRIL) 20 MG tablet, lisinopril 20 mg tablet  TAKE 1 TABLET BY MOUTH EVERY DAY, Disp: , Rfl:   •  meclizine (ANTIVERT) 25 MG tablet, Take 25 mg by mouth 3 (Three) Times a Day As Needed for dizziness., Disp: , Rfl:   •  meclizine (ANTIVERT) 25 MG tablet, Take 1 tablet by mouth Every 8 (Eight) Hours., Disp: , Rfl:   •  metoprolol tartrate (LOPRESSOR) 25 MG tablet, Take 12.5 mg by mouth 2 (Two) Times a Day., Disp: , Rfl:   •  nitroglycerin (NITROSTAT) 0.4 MG SL tablet, nitroglycerin 0.4 mg sublingual tablet, Disp: , Rfl:   •  pantoprazole (PROTONIX) 40 MG EC tablet, pantoprazole 40 mg tablet,delayed release  TAKE 1 TABLET BY MOUTH EVERY DAY, Disp: , Rfl:   •  pantoprazole (PROTONIX) 40 MG EC tablet, Take 1 tablet by mouth Daily., Disp: , Rfl:   •  potassium chloride (K-DUR,KLOR-CON) 20 MEQ CR tablet, Take 1 tablet by mouth Daily., Disp: , Rfl:   •  rivaroxaban (XARELTO) 20 MG tablet, Take 20 mg by mouth Daily., Disp: , Rfl:   •  sildenafil (REVATIO) 20 MG tablet, TAKE 1 TABLET (20 MG) BY MOUTH 3 TIMES PER DAY, Disp: , Rfl:   •  spironolactone (ALDACTONE) 25 MG tablet, Take 25 mg by mouth Daily., Disp: , Rfl:   •  spironolactone (ALDACTONE) 25 MG tablet, Take 1 tablet by mouth Daily., Disp: , Rfl:   •  zinc sulfate (ZINCATE) 220 (50 Zn) MG capsule, zinc sulfate 50 mg zinc (220 mg) capsule  TAKE 1 CAPSULE BY MOUTH TWICE A DAY. OTC NOT COVER, Disp: , Rfl:   •  HYDROcodone-acetaminophen (NORCO) 5-325 MG per tablet, hydrocodone 5 mg-acetaminophen 325 mg tablet  TAKE 1 TABLET BY MOUTH EVERY 8 HOURS AS NEEDED FOR PAIN, Disp: , Rfl:   •  Influenza  "Vac High-Dose Quad (Fluzone High-Dose Quadrivalent) 0.7 ML suspension prefilled syringe injection, Fluzone High-Dose Quad 2020-21 (PF) 240 mcg/0.7 mL IM syringe  PHARMACY ADMINISTERED, Disp: , Rfl:   •  potassium chloride ER (K-TAB) 20 MEQ tablet controlled-release ER tablet, Take 20 mEq by mouth Daily. with food, Disp: , Rfl:     Allergies:   Allergies   Allergen Reactions   • Codeine Nausea And Vomiting   • Nitrofurantoin    • Nitrofurantoin Macrocrystal Nausea And Vomiting       Objective     Physical Exam:  Vital Signs:   Vitals:    03/23/22 1406   BP: 119/56  Comment: Per Caregiver   BP Location: Left arm   Patient Position: Sitting   Cuff Size: Adult   Pulse: 68  Comment: Per Caregiver   Resp: 16   Temp: 97.6 °F (36.4 °C)  Comment: Per Caregiver   TempSrc: Oral   Weight: 49.9 kg (110 lb)  Comment: Per Caregiver   Height: 162.6 cm (64\")     Body mass index is 18.88 kg/m².    Physical Exam  Pulmonary:      Effort: Pulmonary effort is normal.   Neurological:      Mental Status: She is alert.           Assessment / Plan      Assessment/Plan:   Diagnoses and all orders for this visit:    1. Chronic pain syndrome (Primary)    2. CKD (chronic kidney disease) stage 4, GFR 15-29 ml/min ([facility])  -     Comprehensive Metabolic Panel; Future    3. Soft neurological signs    4. Underweight     1.  We will refill patient's hydrocodone.  She is only taking at night now.  We will only give her 30 tabs.  Recheck in 1 month.  2.  Recheck CMP in 1 week.  3.  No change.  4.  Encourage eating.    Follow Up:   No follow-ups on file.    Any medications prescribed have been sent electronically to   [facility]/pharmacy #[id number] - [place], [place] - [address] - [phone]  - [phone]   [address] [place] [address]  Phone: [phone] Fax: [phone]    Zeeshan Ross MD  [facility]   03/23/22  13:02 EDT  "

## 2022-04-10 DIAGNOSIS — D64.9 ANEMIA, UNSPECIFIED TYPE: ICD-10-CM

## 2022-04-10 DIAGNOSIS — E87.6 HYPOKALEMIA: Primary | ICD-10-CM

## 2022-04-11 RX ORDER — FERROUS SULFATE 325(65) MG
TABLET ORAL
Qty: 90 TABLET | Refills: 5 | Status: SHIPPED | OUTPATIENT
Start: 2022-04-11 | End: 2023-02-27

## 2022-04-11 RX ORDER — POTASSIUM CHLORIDE 1500 MG/1
TABLET, FILM COATED, EXTENDED RELEASE ORAL
Qty: 90 TABLET | Refills: 1 | Status: SHIPPED | OUTPATIENT
Start: 2022-04-11 | End: 2022-06-15 | Stop reason: SDUPTHER

## 2022-04-13 DIAGNOSIS — G89.4 CHRONIC PAIN SYNDROME: Primary | ICD-10-CM

## 2022-04-13 NOTE — TELEPHONE ENCOUNTER
Incoming Refill Request      Medication requested (name and dose): HYDROCODONE 3X DAILY    Pharmacy where request should be sent: [facility]    Additional details provided by patient: STATED WRONG DOSE WAS CALLED IN. 3X DAILY    Best call back number: [phone]    Does the patient have less than a 3 day supply:  [x] Yes  [] No    Genaro TRAVIS Rep  04/13/22, 15:04 EDT

## 2022-04-15 RX ORDER — HYDROCODONE BITARTRATE AND ACETAMINOPHEN 7.5; 325 MG/1; MG/1
1 TABLET ORAL EVERY 6 HOURS PRN
Qty: 90 TABLET | Refills: 0 | Status: SHIPPED | OUTPATIENT
Start: 2022-04-15 | End: 2022-04-18 | Stop reason: SDUPTHER

## 2022-04-18 ENCOUNTER — TELEPHONE (OUTPATIENT)
Dept: FAMILY MEDICINE CLINIC | Facility: CLINIC | Age: 87
End: 2022-04-18

## 2022-04-18 DIAGNOSIS — G89.29 OTHER CHRONIC PAIN: Primary | ICD-10-CM

## 2022-04-18 RX ORDER — HYDROCODONE BITARTRATE AND ACETAMINOPHEN 10; 325 MG/1; MG/1
1 TABLET ORAL EVERY 8 HOURS PRN
Qty: 90 TABLET | Refills: 0 | Status: SHIPPED | OUTPATIENT
Start: 2022-04-18 | End: 2022-06-15 | Stop reason: SDUPTHER

## 2022-04-18 NOTE — TELEPHONE ENCOUNTER
Rx Refill Note    Requested Prescriptions     Pending Prescriptions Disp Refills   • HYDROcodone-acetaminophen (NORCO)  MG per tablet 90 tablet      Sig: Take 2 tablets by mouth Daily.        Last office visit with prescribing clinician:  4/13/2022  Next office visit with prescribing clinician: none  Last labs: none  Last refill:04/13/2022  Pharmacy [facility]     Pt last refill was only sent in for 30 not 90 she takes 3 a day

## 2022-04-18 NOTE — TELEPHONE ENCOUNTER
----- Message from Emily Green sent at 4/16/2022  4:00 PM EDT -----  Regarding: Hydrocodone prescription  Ms. Diaz is unable to get out and walk for her to be seen every month we noticed you changed her hydrocodone for only a month and she was getting for 3 months we need it to go back to the way it was please

## 2022-04-27 ENCOUNTER — LAB (OUTPATIENT)
Dept: FAMILY MEDICINE CLINIC | Facility: CLINIC | Age: 87
End: 2022-04-27

## 2022-04-27 DIAGNOSIS — N18.4 CKD (CHRONIC KIDNEY DISEASE) STAGE 4, GFR 15-29 ML/MIN: ICD-10-CM

## 2022-04-27 PROCEDURE — 36415 COLL VENOUS BLD VENIPUNCTURE: CPT | Performed by: FAMILY MEDICINE

## 2022-04-28 LAB
ALBUMIN SERPL-MCNC: 4.4 G/DL (ref 3.6–4.6)
ALBUMIN/GLOB SERPL: 1.6 {RATIO} (ref 1.2–2.2)
ALP SERPL-CCNC: 75 IU/L (ref 44–121)
ALT SERPL-CCNC: 8 IU/L (ref 0–32)
AST SERPL-CCNC: 13 IU/L (ref 0–40)
BILIRUB SERPL-MCNC: 0.5 MG/DL (ref 0–1.2)
BUN SERPL-MCNC: 32 MG/DL (ref 8–27)
BUN/CREAT SERPL: 15 (ref 12–28)
CALCIUM SERPL-MCNC: 9.4 MG/DL (ref 8.7–10.3)
CHLORIDE SERPL-SCNC: 97 MMOL/L (ref 96–106)
CO2 SERPL-SCNC: 20 MMOL/L (ref 20–29)
CREAT SERPL-MCNC: 2.11 MG/DL (ref 0.57–1)
EGFRCR SERPLBLD CKD-EPI 2021: 22 ML/MIN/1.73
GLOBULIN SER CALC-MCNC: 2.8 G/DL (ref 1.5–4.5)
GLUCOSE SERPL-MCNC: 90 MG/DL (ref 65–99)
POTASSIUM SERPL-SCNC: 5.2 MMOL/L (ref 3.5–5.2)
PROT SERPL-MCNC: 7.2 G/DL (ref 6–8.5)
SODIUM SERPL-SCNC: 137 MMOL/L (ref 134–144)

## 2022-05-29 DIAGNOSIS — N32.81 OVERACTIVE BLADDER: Primary | ICD-10-CM

## 2022-05-31 RX ORDER — ALENDRONATE SODIUM 70 MG/1
TABLET ORAL
Qty: 12 TABLET | Refills: 1 | Status: SHIPPED | OUTPATIENT
Start: 2022-05-31 | End: 2022-09-15 | Stop reason: SDUPTHER

## 2022-06-13 DIAGNOSIS — G62.9 NEUROPATHY: Primary | ICD-10-CM

## 2022-06-14 RX ORDER — GABAPENTIN 400 MG/1
CAPSULE ORAL
Qty: 90 CAPSULE | Refills: 0 | Status: SHIPPED | OUTPATIENT
Start: 2022-06-14 | End: 2022-06-15 | Stop reason: SDUPTHER

## 2022-06-14 NOTE — TELEPHONE ENCOUNTER
Rx Refill Note    Requested Prescriptions     Pending Prescriptions Disp Refills   • gabapentin (NEURONTIN) 400 MG capsule [Pharmacy Med Name: GABAPENTIN 400 MG CAPSULE] 90 capsule 0     Sig: TAKE 1 CAPSULE BY MOUTH THREE TIMES A DAY        Last office visit with prescribing clinician: Visit date not found      Next office visit with prescribing clinician: 6/15/2022   Last labs:   Last refill: 02/19/22 for 90 with 2 refills  Pharmacy (be sure to add in Epic). correct

## 2022-06-15 ENCOUNTER — OFFICE VISIT (OUTPATIENT)
Dept: FAMILY MEDICINE CLINIC | Facility: CLINIC | Age: 87
End: 2022-06-15

## 2022-06-15 VITALS
BODY MASS INDEX: 20.04 KG/M2 | OXYGEN SATURATION: 95 % | HEART RATE: 77 BPM | RESPIRATION RATE: 14 BRPM | WEIGHT: 117.4 LBS | TEMPERATURE: 97.8 F | SYSTOLIC BLOOD PRESSURE: 118 MMHG | DIASTOLIC BLOOD PRESSURE: 72 MMHG | HEIGHT: 64 IN

## 2022-06-15 DIAGNOSIS — S51.012D LACERATION OF LEFT ELBOW, SUBSEQUENT ENCOUNTER: Primary | ICD-10-CM

## 2022-06-15 DIAGNOSIS — G62.9 NEUROPATHY: ICD-10-CM

## 2022-06-15 DIAGNOSIS — M19.90 IDIOPATHIC OSTEOARTHRITIS: ICD-10-CM

## 2022-06-15 DIAGNOSIS — G89.29 OTHER CHRONIC PAIN: ICD-10-CM

## 2022-06-15 PROBLEM — F41.9 ANXIETY: Status: ACTIVE | Noted: 2022-06-15

## 2022-06-15 PROBLEM — R53.83 FATIGUE: Status: ACTIVE | Noted: 2022-06-15

## 2022-06-15 PROBLEM — F33.41 RECURRENT MAJOR DEPRESSION IN PARTIAL REMISSION: Status: ACTIVE | Noted: 2022-06-15

## 2022-06-15 PROBLEM — S51.012A LACERATION OF LEFT ELBOW: Status: ACTIVE | Noted: 2022-06-15

## 2022-06-15 PROBLEM — Z86.73 HISTORY OF CEREBROVASCULAR ACCIDENT: Status: ACTIVE | Noted: 2022-06-15

## 2022-06-15 PROBLEM — K21.9 GASTROESOPHAGEAL REFLUX DISEASE WITHOUT ESOPHAGITIS: Status: ACTIVE | Noted: 2022-06-15

## 2022-06-15 PROBLEM — I27.20 PULMONARY HYPERTENSION: Status: ACTIVE | Noted: 2022-06-15

## 2022-06-15 PROBLEM — J30.2 SEASONAL ALLERGIES: Status: ACTIVE | Noted: 2022-06-15

## 2022-06-15 PROBLEM — R42 VERTIGO: Status: ACTIVE | Noted: 2022-06-15

## 2022-06-15 PROBLEM — J44.9 CHRONIC OBSTRUCTIVE LUNG DISEASE: Status: ACTIVE | Noted: 2022-06-15

## 2022-06-15 PROBLEM — Z79.01 LONG TERM (CURRENT) USE OF ANTICOAGULANTS: Status: ACTIVE | Noted: 2022-06-15

## 2022-06-15 PROBLEM — M54.2 CHRONIC NECK PAIN: Status: ACTIVE | Noted: 2022-06-15

## 2022-06-15 PROBLEM — Z79.899 HIGH RISK MEDICATION USE: Status: ACTIVE | Noted: 2022-06-15

## 2022-06-15 PROBLEM — Z86.711 HISTORY OF PULMONARY EMBOLISM: Status: ACTIVE | Noted: 2022-06-15

## 2022-06-15 PROBLEM — M81.0 OSTEOPOROSIS: Status: ACTIVE | Noted: 2022-06-15

## 2022-06-15 PROBLEM — F03.90 DEMENTIA: Status: ACTIVE | Noted: 2022-06-15

## 2022-06-15 PROCEDURE — 99213 OFFICE O/P EST LOW 20 MIN: CPT | Performed by: FAMILY MEDICINE

## 2022-06-15 RX ORDER — MECLIZINE HYDROCHLORIDE 25 MG/1
25 TABLET ORAL 3 TIMES DAILY PRN
Qty: 90 TABLET | Refills: 11 | Status: SHIPPED | OUTPATIENT
Start: 2022-06-15 | End: 2023-03-27

## 2022-06-15 RX ORDER — PANTOPRAZOLE SODIUM 40 MG/1
40 TABLET, DELAYED RELEASE ORAL DAILY
Qty: 90 TABLET | Refills: 1 | Status: SHIPPED | OUTPATIENT
Start: 2022-06-15 | End: 2022-07-25

## 2022-06-15 RX ORDER — HYDROCODONE BITARTRATE AND ACETAMINOPHEN 10; 325 MG/1; MG/1
1 TABLET ORAL EVERY 8 HOURS PRN
Qty: 90 TABLET | Refills: 0 | Status: SHIPPED | OUTPATIENT
Start: 2022-06-15 | End: 2022-09-15 | Stop reason: SDUPTHER

## 2022-06-15 RX ORDER — GABAPENTIN 400 MG/1
400 CAPSULE ORAL 3 TIMES DAILY
Qty: 90 CAPSULE | Refills: 2 | Status: SHIPPED | OUTPATIENT
Start: 2022-06-15 | End: 2022-09-15 | Stop reason: SDUPTHER

## 2022-06-15 RX ORDER — ESCITALOPRAM OXALATE 20 MG/1
1 TABLET ORAL DAILY
COMMUNITY
Start: 2021-11-02 | End: 2022-06-15 | Stop reason: SDUPTHER

## 2022-06-15 NOTE — PROGRESS NOTES
Patient Name: Emily Green  : 1935   MRN: [id number]     Chief Complaint:    Chief Complaint   Patient presents with   • ER follow up     Pt was seen in ER seen at [facility] on 2022 fell from bed. Multiple injuries       History of Present Illness: Emily Green is a 87 y.o. female who is here today for follow up from ER visit  HPI        Review of Systems:   Review of Systems   Constitutional: Negative.    HENT: Negative.    Eyes: Negative.    Respiratory: Negative.    Cardiovascular: Negative.    Gastrointestinal: Negative.    Neurological: Negative.         Past Medical History:   Past Medical History:   Diagnosis Date   • Allergy     SEASONAL   • Anemia 2017   • Anxiety    • Arthritis    • Atrial fibrillation ([facility]) 2017    CHRONIC   • Auditory effects     DECREASED AUDITORY ACUITY   • C. difficile colitis    • Cardiomegaly    • Carotid artery stenosis    • Carpal tunnel syndrome    • CHF (congestive heart failure) ([facility]) 2017   • Chronic diarrhea    • Chronic neck pain    • Chronic obstructive lung disease ([facility])    • Chronic obstructive lung disease ([facility]) 6/15/2022   • CVA (cerebral vascular accident) ([facility])    • Degenerative arthritis of hip     left, not a candidate for operation   • Dementia ([facility])    • Dementia ([facility])    • Depression    • Dizziness    • Dysrhythmias     CARDIAC   • Early cataract    • Fatigue     CHRONIC   • Gastroesophageal reflux disease without esophagitis    • Headache    • Hereditary sensory neuropathy    • High cholesterol    • High risk medication use    • History of cerebrovascular accident    • History of pulmonary embolus (PE)    • Hyperlipidemia 2017   • Hypertension 2017    ESSENTIAL   • Hypokalemia 2017   • Hypothyroidism 2017    acquired   • Left carotid stenosis 2017   • Long term current use of anticoagulant    • Neuropathy    • Osteoporosis    • Paroxysmal atrial fibrillation ([facility])    • Peripheral vascular disease  (HCC)    • Posterior circulation stroke (HCC)     WITH VERTIGO   • Pulmonary hypertension (HCC)    • Recurrent major depression in partial remission (HCC)    • Seizures (HCC)     Thinks may have hx seizures   • 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   • Thyroid disease    • TIA (transient ischemic attack)     history   • Vertebral artery occlusion, left    • Vertigo        Past Surgical History:   Past Surgical History:   Procedure Laterality Date   • ANGIOPLASTY  2011    OF ARTERY OF LOWER EXTREMITY   • APPENDECTOMY     • CAROTID ENDARTERECTOMY Right     ~1999 deborah   • HEMORRHOIDECTOMY     • HYSTERECTOMY     • OTHER SURGICAL HISTORY  11/2017    TRANSCATHETER AORTIC VALVE REPLACEMENT   • OTHER SURGICAL HISTORY  2004    BLOOD TRANSFUSION   • REPLACEMENT TOTAL HIP LATERAL POSITION Right     UNKNOWN DETAILS 2008, 2004   • TOTAL ABDOMINAL HYSTERECTOMY WITH SALPINGO OOPHORECTOMY      AT AGE 38       Family History:   Family History   Problem Relation Age of Onset   • High cholesterol Mother    • Osteoporosis Mother    • Stroke Mother    • Other Mother         CARDIOVASCULAR DISEASE   • Hearing loss Mother    • Heart disease Mother    • Hypertension Mother    • Arthritis Mother    • Other Father         CARDIOVASCULAR DISEASE   • Heart disease Father    • Hypertension Father    • Arthritis Father    • High cholesterol Father    • Osteoporosis Father    • Hearing loss Father    • Irritable bowel syndrome Father    • Stroke Father    • Hearing loss Sister    • Hypertension Sister    • High cholesterol Sister    • Osteoporosis Sister    • Arthritis Brother    • Other Brother         CARDIOVASCULAR DISEASE   • Kidney nephrosis Daughter    • Hypertension Other    • Cancer Other         GASTRIC   • Cancer Other         COLON CANCER       Social History:   Social History     Socioeconomic History   • Marital status:    Tobacco Use   • Smoking status: Never Smoker   • Smokeless tobacco: Never Used   Substance and Sexual  Activity   • Alcohol use: Never   • Drug use: Never   • Sexual activity: Defer     Partners: Female       Medications:     Current Outpatient Medications:   •  alendronate (FOSAMAX) 70 MG tablet, TAKE 1 TABLET BY MOUTH EVERY WEEK, Disp: 12 tablet, Rfl: 1  •  ambrisentan (LETAIRIS) 10 MG tablet, ambrisentan 10 mg tablet, Disp: , Rfl:   •  AMLODIPINE BESYLATE PO, Take 5 mg by mouth Daily., Disp: , Rfl:   •  apixaban (ELIQUIS) 5 MG tablet tablet, Take 1 tablet by mouth 2 (Two) Times a Day., Disp: 60 tablet, Rfl: 11  •  Ascorbic Acid 500 MG chewable tablet, Strawberry C 500 mg chewable tablet  TAKE 1 CAPLET BY MOUTH TWICE A DAY. OTC NOT COVERED., Disp: , Rfl:   •  atorvastatin (LIPITOR) 20 MG tablet, Take 1 tablet by mouth Daily., Disp: 30 tablet, Rfl: 11  •  benzonatate (TESSALON) 100 MG capsule, benzonatate 100 mg capsule  TAKE 1 CAPSULE BY MOUTH THREE TIMES A DAY AS NEEDED FOR COUGH. NOT COVERED, Disp: , Rfl:   •  carvedilol (COREG) 3.125 MG tablet, Take 1 tablet by mouth 2 (Two) Times a Day With Meals., Disp: 60 tablet, Rfl: 11  •  Cholecalciferol 50 MCG (2000 UT) capsule, Vitamin D3 50 mcg (2,000 unit) capsule  Daily, Disp: , Rfl:   •  dexamethasone (DECADRON) 4 MG tablet, dexamethasone 4 mg tablet  TAKE 1 AND 1/2 TABLETS BY MOUTH EVERY DAY, Disp: , Rfl:   •  Eliquis 5 MG tablet tablet, Take 5 mg by mouth 2 (Two) Times a Day., Disp: , Rfl:   •  escitalopram (LEXAPRO) 20 MG tablet, Take 20 mg by mouth Daily., Disp: , Rfl:   •  famotidine (PEPCID) 40 MG tablet, Take 40 mg by mouth Daily., Disp: , Rfl:   •  ferrous sulfate 325 (65 FE) MG tablet, TAKE 1 TABLET BY MOUTH THREE TIMES A DAY, Disp: 90 tablet, Rfl: 5  •  furosemide (LASIX) 20 MG tablet, Take 1 tablet by mouth Every 12 (Twelve) Hours., Disp: 60 tablet, Rfl: 11  •  guaiFENesin (MUCINEX) 600 MG 12 hr tablet, Mucinex 600 mg tablet, extended release  TAKE 1 TABLET BY MOUTH EVERY 12 HOURS AS NEEDED, Disp: , Rfl:   •  Influenza Vac High-Dose Quad (Fluzone High-Dose  "Quadrivalent) 0.7 ML suspension prefilled syringe injection, Fluzone High-Dose Quad 2020-21 (PF) 240 mcg/0.7 mL IM syringe  PHARMACY ADMINISTERED, Disp: , Rfl:   •  Lactobacillus-Inulin (Culturelle Adult Ult Balance) capsule, Probiotic Formula 10 billion cell(2 billion ea) capsule, Disp: , Rfl:   •  lisinopril (PRINIVIL,ZESTRIL) 20 MG tablet, lisinopril 20 mg tablet  TAKE 1 TABLET BY MOUTH EVERY DAY, Disp: , Rfl:   •  nitroglycerin (NITROSTAT) 0.4 MG SL tablet, nitroglycerin 0.4 mg sublingual tablet, Disp: , Rfl:   •  potassium chloride (K-DUR,KLOR-CON) 20 MEQ CR tablet, Take 1 tablet by mouth Daily., Disp: , Rfl:   •  sildenafil (REVATIO) 20 MG tablet, TAKE 1 TABLET (20 MG) BY MOUTH 3 TIMES PER DAY, Disp: , Rfl:   •  spironolactone (ALDACTONE) 25 MG tablet, Take 25 mg by mouth Daily., Disp: , Rfl:   •  zinc sulfate (ZINCATE) 220 (50 Zn) MG capsule, zinc sulfate 50 mg zinc (220 mg) capsule  TAKE 1 CAPSULE BY MOUTH TWICE A DAY. OTC NOT COVER, Disp: , Rfl:   •  gabapentin (NEURONTIN) 400 MG capsule, Take 1 capsule by mouth 3 (Three) Times a Day., Disp: 90 capsule, Rfl: 2  •  HYDROcodone-acetaminophen (NORCO)  MG per tablet, Take 1 tablet by mouth Every 8 (Eight) Hours As Needed for Moderate Pain ., Disp: 90 tablet, Rfl: 0  •  meclizine (ANTIVERT) 25 MG tablet, Take 1 tablet by mouth 3 (Three) Times a Day As Needed for Dizziness., Disp: 90 tablet, Rfl: 11  •  pantoprazole (PROTONIX) 40 MG EC tablet, Take 1 tablet by mouth Daily., Disp: 90 tablet, Rfl: 1    Allergies:   Allergies   Allergen Reactions   • Codeine Nausea And Vomiting   • Nitrofurantoin    • Nitrofurantoin Macrocrystal Nausea And Vomiting         Physical Exam:  Vital Signs:   Vitals:    06/15/22 1107   BP: 118/72   BP Location: Left arm   Patient Position: Sitting   Cuff Size: Adult   Pulse: 77   Resp: 14   Temp: 97.8 °F (36.6 °C)   TempSrc: Temporal   SpO2: 95%   Weight: 53.3 kg (117 lb 6.4 oz)   Height: 162.6 cm (64\")   PainSc: 0-No pain     Body " mass index is 20.15 kg/m².     Physical Exam  Vitals and nursing note reviewed.   Constitutional:       Appearance: Normal appearance. She is normal weight.   HENT:      Head: Normocephalic and atraumatic.      Right Ear: Tympanic membrane, ear canal and external ear normal.      Left Ear: Tympanic membrane, ear canal and external ear normal.      Nose: Nose normal.      Mouth/Throat:      Mouth: Mucous membranes are dry.      Pharynx: Oropharynx is clear.   Eyes:      Extraocular Movements: Extraocular movements intact.      Conjunctiva/sclera: Conjunctivae normal.      Pupils: Pupils are equal, round, and reactive to light.   Cardiovascular:      Rate and Rhythm: Normal rate and regular rhythm.      Pulses: Normal pulses.      Heart sounds: Normal heart sounds.   Pulmonary:      Effort: Pulmonary effort is normal.      Breath sounds: Normal breath sounds.   Musculoskeletal:      Cervical back: Normal range of motion and neck supple.   Feet:      Comments:      Neurological:      Mental Status: She is alert.         Procedures      Assessment/Plan:   Diagnoses and all orders for this visit:    1. Laceration of left elbow, subsequent encounter (Primary)  Assessment & Plan:  Sutures removed without difficulty.  Wound was cleaned afterwards.      2. Neuropathy  Assessment & Plan:  Refill gabapentin.  She is doing well with this.  We will follow.      3. Idiopathic osteoarthritis  Assessment & Plan:  Refill her hydrocodone.  Patient is in a diaper so I cannot get a UDS.  Return to clinic in 1 month.             Follow Up:   No follow-ups on file.    Zeeshan Ross MD  [facility]

## 2022-06-15 NOTE — ASSESSMENT & PLAN NOTE
Refill her hydrocodone.  Patient is in a diaper so I cannot get a UDS.  Return to clinic in 1 month.

## 2022-07-25 RX ORDER — FAMOTIDINE 40 MG/1
TABLET, FILM COATED ORAL
Qty: 90 TABLET | Refills: 0 | Status: SHIPPED | OUTPATIENT
Start: 2022-07-25 | End: 2022-08-12

## 2022-07-25 NOTE — TELEPHONE ENCOUNTER
Rx Refill Note    Requested Prescriptions     Pending Prescriptions Disp Refills   • famotidine (PEPCID) 40 MG tablet [Pharmacy Med Name: FAMOTIDINE 40 MG TABLET] 90 tablet 0     Sig: TAKE 1 TABLET BY MOUTH EVERYDAY AT BEDTIME        Last office visit with prescribing clinician: 6/15/2022      Next office visit with prescribing clinician: Visit date not found   Last labs:   Last refill: 11/02/2021 via [facility]   Pharmacy (be sure to add in Epic). correct

## 2022-08-11 DIAGNOSIS — F41.9 ANXIETY: ICD-10-CM

## 2022-08-11 DIAGNOSIS — K21.9 GASTROESOPHAGEAL REFLUX DISEASE WITHOUT ESOPHAGITIS: Primary | ICD-10-CM

## 2022-08-12 RX ORDER — ESCITALOPRAM OXALATE 20 MG/1
TABLET ORAL
Qty: 90 TABLET | Refills: 1 | Status: SHIPPED | OUTPATIENT
Start: 2022-08-12

## 2022-08-12 RX ORDER — FAMOTIDINE 40 MG/1
TABLET, FILM COATED ORAL
Qty: 90 TABLET | Refills: 0 | Status: SHIPPED | OUTPATIENT
Start: 2022-08-12 | End: 2022-09-15 | Stop reason: SDUPTHER

## 2022-09-08 DIAGNOSIS — E87.6 HYPOKALEMIA: ICD-10-CM

## 2022-09-08 RX ORDER — POTASSIUM CHLORIDE 1500 MG/1
TABLET, FILM COATED, EXTENDED RELEASE ORAL
Qty: 90 TABLET | Refills: 1 | Status: SHIPPED | OUTPATIENT
Start: 2022-09-08 | End: 2022-09-15 | Stop reason: SDUPTHER

## 2022-09-15 ENCOUNTER — LAB (OUTPATIENT)
Dept: FAMILY MEDICINE CLINIC | Facility: CLINIC | Age: 87
End: 2022-09-15

## 2022-09-15 DIAGNOSIS — G89.29 OTHER CHRONIC PAIN: ICD-10-CM

## 2022-09-15 DIAGNOSIS — K21.9 GASTROESOPHAGEAL REFLUX DISEASE WITHOUT ESOPHAGITIS: ICD-10-CM

## 2022-09-15 DIAGNOSIS — G62.9 NEUROPATHY: ICD-10-CM

## 2022-09-15 DIAGNOSIS — Z79.899 ENCOUNTER FOR LONG-TERM (CURRENT) USE OF OTHER MEDICATIONS: Primary | ICD-10-CM

## 2022-09-15 DIAGNOSIS — N32.81 OVERACTIVE BLADDER: ICD-10-CM

## 2022-09-15 PROCEDURE — 36415 COLL VENOUS BLD VENIPUNCTURE: CPT | Performed by: FAMILY MEDICINE

## 2022-09-15 RX ORDER — ALENDRONATE SODIUM 70 MG/1
70 TABLET ORAL WEEKLY
Qty: 12 TABLET | Refills: 0 | Status: SHIPPED | OUTPATIENT
Start: 2022-09-15 | End: 2023-03-05

## 2022-09-15 RX ORDER — POTASSIUM CHLORIDE 20 MEQ/1
20 TABLET, EXTENDED RELEASE ORAL DAILY
Qty: 90 TABLET | Refills: 0 | Status: SHIPPED | OUTPATIENT
Start: 2022-09-15 | End: 2022-11-14

## 2022-09-15 RX ORDER — FAMOTIDINE 40 MG/1
40 TABLET, FILM COATED ORAL
Qty: 90 TABLET | Refills: 0 | Status: SHIPPED | OUTPATIENT
Start: 2022-09-15 | End: 2023-02-27

## 2022-09-15 RX ORDER — PANTOPRAZOLE SODIUM 40 MG/1
40 TABLET, DELAYED RELEASE ORAL DAILY
COMMUNITY
End: 2022-09-15 | Stop reason: SDUPTHER

## 2022-09-15 RX ORDER — GABAPENTIN 400 MG/1
400 CAPSULE ORAL 3 TIMES DAILY
Qty: 90 CAPSULE | Refills: 2 | Status: SHIPPED | OUTPATIENT
Start: 2022-09-15 | End: 2022-12-01 | Stop reason: SDUPTHER

## 2022-09-15 RX ORDER — PANTOPRAZOLE SODIUM 40 MG/1
40 TABLET, DELAYED RELEASE ORAL DAILY
Qty: 90 TABLET | Refills: 0 | Status: SHIPPED | OUTPATIENT
Start: 2022-09-15 | End: 2023-02-27

## 2022-09-15 RX ORDER — HYDROCODONE BITARTRATE AND ACETAMINOPHEN 10; 325 MG/1; MG/1
1 TABLET ORAL EVERY 8 HOURS PRN
Qty: 90 TABLET | Refills: 0 | Status: SHIPPED | OUTPATIENT
Start: 2022-09-15 | End: 2022-12-01 | Stop reason: SDUPTHER

## 2022-09-15 RX ORDER — APIXABAN 5 MG/1
5 TABLET, FILM COATED ORAL 2 TIMES DAILY
Qty: 60 TABLET | Refills: 5 | Status: SHIPPED | OUTPATIENT
Start: 2022-09-15 | End: 2022-12-01 | Stop reason: SDUPTHER

## 2022-09-15 NOTE — TELEPHONE ENCOUNTER
Rx Refill Note    Requested Prescriptions     Pending Prescriptions Disp Refills    Eliquis 5 MG tablet tablet 60 tablet 5     Sig: Take 1 tablet by mouth 2 (Two) Times a Day.    pantoprazole (PROTONIX) 40 MG EC tablet 90 tablet 0     Sig: Take 1 tablet by mouth Daily.    gabapentin (NEURONTIN) 400 MG capsule 90 capsule 2     Sig: Take 1 capsule by mouth 3 (Three) Times a Day.    potassium chloride (K-DUR,KLOR-CON) 20 MEQ CR tablet 90 tablet 0     Sig: Take 1 tablet by mouth Daily.    famotidine (PEPCID) 40 MG tablet 90 tablet 0     Sig: Take 1 tablet by mouth every night at bedtime.    alendronate (FOSAMAX) 70 MG tablet 12 tablet 0     Sig: Take 1 tablet by mouth 1 (One) Time Per Week.    HYDROcodone-acetaminophen (NORCO)  MG per tablet 90 tablet 0     Sig: Take 1 tablet by mouth Every 8 (Eight) Hours As Needed for Moderate Pain.        Last office visit with prescribing clinician: 6/15/2022      Next office visit with prescribing clinician:none but care giver is going to call to make a my chart appt  Last labs: today  Last refill:06/15/2022    Pharmacy

## 2022-09-16 LAB
ALBUMIN SERPL-MCNC: 4.3 G/DL (ref 3.6–4.6)
ALBUMIN/GLOB SERPL: 1.8 {RATIO} (ref 1.2–2.2)
ALP SERPL-CCNC: 66 IU/L (ref 44–121)
ALT SERPL-CCNC: 7 IU/L (ref 0–32)
AST SERPL-CCNC: 13 IU/L (ref 0–40)
BASOPHILS # BLD AUTO: 0 X10E3/UL (ref 0–0.2)
BASOPHILS NFR BLD AUTO: 1 %
BILIRUB SERPL-MCNC: 0.6 MG/DL (ref 0–1.2)
BUN SERPL-MCNC: 29 MG/DL (ref 8–27)
BUN/CREAT SERPL: 12 (ref 12–28)
CALCIUM SERPL-MCNC: 9.1 MG/DL (ref 8.7–10.3)
CHLORIDE SERPL-SCNC: 98 MMOL/L (ref 96–106)
CHOLEST SERPL-MCNC: 147 MG/DL (ref 100–199)
CK SERPL-CCNC: 30 U/L (ref 26–161)
CO2 SERPL-SCNC: 24 MMOL/L (ref 20–29)
CREAT SERPL-MCNC: 2.47 MG/DL (ref 0.57–1)
EGFRCR-CYS SERPLBLD CKD-EPI 2021: 18 ML/MIN/1.73
EOSINOPHIL # BLD AUTO: 0.6 X10E3/UL (ref 0–0.4)
EOSINOPHIL NFR BLD AUTO: 9 %
ERYTHROCYTE [DISTWIDTH] IN BLOOD BY AUTOMATED COUNT: 11.7 % (ref 11.7–15.4)
GLOBULIN SER CALC-MCNC: 2.4 G/DL (ref 1.5–4.5)
GLUCOSE SERPL-MCNC: 96 MG/DL (ref 65–99)
HBA1C MFR BLD: 5.4 % (ref 4.8–5.6)
HCT VFR BLD AUTO: 29.2 % (ref 34–46.6)
HDLC SERPL-MCNC: 35 MG/DL
HGB BLD-MCNC: 9.6 G/DL (ref 11.1–15.9)
IMM GRANULOCYTES # BLD AUTO: 0 X10E3/UL (ref 0–0.1)
IMM GRANULOCYTES NFR BLD AUTO: 0 %
LDLC SERPL CALC-MCNC: 93 MG/DL (ref 0–99)
LYMPHOCYTES # BLD AUTO: 1.6 X10E3/UL (ref 0.7–3.1)
LYMPHOCYTES NFR BLD AUTO: 25 %
MCH RBC QN AUTO: 33.7 PG (ref 26.6–33)
MCHC RBC AUTO-ENTMCNC: 32.9 G/DL (ref 31.5–35.7)
MCV RBC AUTO: 103 FL (ref 79–97)
MONOCYTES # BLD AUTO: 0.6 X10E3/UL (ref 0.1–0.9)
MONOCYTES NFR BLD AUTO: 10 %
NEUTROPHILS # BLD AUTO: 3.5 X10E3/UL (ref 1.4–7)
NEUTROPHILS NFR BLD AUTO: 55 %
PLATELET # BLD AUTO: 134 X10E3/UL (ref 150–450)
POTASSIUM SERPL-SCNC: 4.9 MMOL/L (ref 3.5–5.2)
PROT SERPL-MCNC: 6.7 G/DL (ref 6–8.5)
RBC # BLD AUTO: 2.85 X10E6/UL (ref 3.77–5.28)
SODIUM SERPL-SCNC: 137 MMOL/L (ref 134–144)
TRIGL SERPL-MCNC: 102 MG/DL (ref 0–149)
TSH SERPL DL<=0.005 MIU/L-ACNC: 3.51 UIU/ML (ref 0.45–4.5)
VLDLC SERPL CALC-MCNC: 19 MG/DL (ref 5–40)
WBC # BLD AUTO: 6.3 X10E3/UL (ref 3.4–10.8)

## 2022-10-04 RX ORDER — POTASSIUM CHLORIDE 1500 MG/1
TABLET, EXTENDED RELEASE ORAL
Qty: 90 TABLET | Refills: 0 | OUTPATIENT
Start: 2022-10-04

## 2022-10-25 ENCOUNTER — TELEPHONE (OUTPATIENT)
Dept: FAMILY MEDICINE CLINIC | Facility: CLINIC | Age: 87
End: 2022-10-25

## 2022-10-25 NOTE — TELEPHONE ENCOUNTER
Caller: JEFF URIBE     Relationship: CAREGIVER    Best call back number: [phone]    What is your medical concern? EXTREME LOSS OF WEIGHT    How long has this issue been going on? FEW MONTHS    Is your provider already aware of this issue? YES    Have you been treated for this issue? YES HOWEVER SHE IS CONTINUEING TO LOSE WEIGHT. NOTHING THEY HAVE TRIED HAS WORKED FOR HER. THEY HAVE TRIED ENSURE, PUDDING , ANYTHING WITH CALORIES HOWEVER THEY GIVE HER DIARRHEA AS SHE IS LACTOSE INTOLERANT.  THEY ARE ASKING IF THERE IS ANYTHING ELSE YOU CAN SUGGEST. SHE WEIGHED 104 ON Sunday AND THEN WHEN SHE WEIGHED HER THIS MORNING IT 96.7 LBS.    PLEASE ADVISE     [id number] - [place], [place] - [address] AT [address] [facility] [phone] [facility] [phone] FX

## 2022-10-26 ENCOUNTER — TELEPHONE (OUTPATIENT)
Dept: FAMILY MEDICINE CLINIC | Facility: CLINIC | Age: 87
End: 2022-10-26

## 2022-10-26 NOTE — TELEPHONE ENCOUNTER
Called caregiver Quyen Costa and told her Dr. Ross said pt need a 30 minute appt with him and he is going to need to draw labs on her also at time of appt, could you please call Quyen Csota care giver and make a 30 minute appt for Ms menendez, see # below  [phone]

## 2022-10-26 NOTE — TELEPHONE ENCOUNTER
Called caregiver Quyen Costa and told her Dr. Ross said pt need a 30 minute appt with him and he is going to need to draw labs on her also at time of appt, could you please call Quyen Costa care giver and make a 30 minute appt for Ms menendez, see # below

## 2022-11-14 RX ORDER — POTASSIUM CHLORIDE 1500 MG/1
TABLET, EXTENDED RELEASE ORAL
Qty: 90 TABLET | Refills: 1 | Status: SHIPPED | OUTPATIENT
Start: 2022-11-14

## 2022-11-14 RX ORDER — SPIRONOLACTONE 25 MG/1
TABLET ORAL
Qty: 90 TABLET | Refills: 1 | Status: SHIPPED | OUTPATIENT
Start: 2022-11-14

## 2022-11-14 RX ORDER — CARVEDILOL 3.12 MG/1
TABLET ORAL
Qty: 180 TABLET | Refills: 1 | Status: SHIPPED | OUTPATIENT
Start: 2022-11-14

## 2022-11-14 NOTE — TELEPHONE ENCOUNTER
Rx Refill Note    Requested Prescriptions     Pending Prescriptions Disp Refills   • spironolactone (ALDACTONE) 25 MG tablet [Pharmacy Med Name: SPIRONOLACTONE 25 MG TABLET] 90 tablet 3     Sig: TAKE 1 TABLET BY MOUTH EVERY DAY   • carvedilol (COREG) 3.125 MG tablet [Pharmacy Med Name: CARVEDILOL 3.125 MG TABLET] 180 tablet 3     Sig: TAKE 1 TABLET BY MOUTH TWICE A DAY WITH FOOD   • KLOR-CON 20 MEQ CR tablet [Pharmacy Med Name: KLOR-CON M20 TABLET] 90 tablet 0     Sig: TAKE 1 TABLET BY MOUTH EVERY DAY        Last office visit with prescribing clinician: 6/15/2022      Next office visit with prescribing clinician: 12/1/2022   Last labs:   Last refill:    Pharmacy [facility]

## 2022-11-21 DIAGNOSIS — G89.29 OTHER CHRONIC PAIN: ICD-10-CM

## 2022-11-21 RX ORDER — HYDROCODONE BITARTRATE AND ACETAMINOPHEN 10; 325 MG/1; MG/1
1 TABLET ORAL EVERY 8 HOURS PRN
Qty: 90 TABLET | Refills: 0 | OUTPATIENT
Start: 2022-11-21

## 2022-11-21 NOTE — TELEPHONE ENCOUNTER
Rx Refill Note    Requested Prescriptions     Pending Prescriptions Disp Refills   • HYDROcodone-acetaminophen (NORCO)  MG per tablet 90 tablet 0     Sig: Take 1 tablet by mouth Every 8 (Eight) Hours As Needed for Moderate Pain.        Last office visit with prescribing clinician: 6/15/2022      Next office visit with prescribing clinician: 12/1/2022   Last labs:   Last refill:    Pharmacy [facility]   This is controlled

## 2022-11-21 NOTE — TELEPHONE ENCOUNTER
Caller: JEFF RUIBE    Relationship: Caregiver (non-relative)    Best call back number: [phone]    Requested Prescriptions:   Requested Prescriptions     Pending Prescriptions Disp Refills   • HYDROcodone-acetaminophen (NORCO)  MG per tablet 90 tablet 0     Sig: Take 1 tablet by mouth Every 8 (Eight) Hours As Needed for Moderate Pain.        Pharmacy where request should be sent: [id number] - [address] [phone] [place] [phone] FX       Does the patient have less than a 3 day supply:  [x] Yes  [] No    Genaro Alvarez Rep   11/21/22 15:32 EST

## 2022-12-01 ENCOUNTER — OFFICE VISIT (OUTPATIENT)
Dept: FAMILY MEDICINE CLINIC | Facility: CLINIC | Age: 87
End: 2022-12-01

## 2022-12-01 VITALS
HEIGHT: 64 IN | HEART RATE: 68 BPM | DIASTOLIC BLOOD PRESSURE: 58 MMHG | BODY MASS INDEX: 17.62 KG/M2 | RESPIRATION RATE: 12 BRPM | TEMPERATURE: 96.8 F | OXYGEN SATURATION: 97 % | WEIGHT: 103.2 LBS | SYSTOLIC BLOOD PRESSURE: 90 MMHG

## 2022-12-01 DIAGNOSIS — G89.29 OTHER CHRONIC PAIN: ICD-10-CM

## 2022-12-01 DIAGNOSIS — G62.9 NEUROPATHY: ICD-10-CM

## 2022-12-01 DIAGNOSIS — R63.4 LOSS OF WEIGHT: Primary | ICD-10-CM

## 2022-12-01 PROCEDURE — 99214 OFFICE O/P EST MOD 30 MIN: CPT | Performed by: FAMILY MEDICINE

## 2022-12-01 RX ORDER — GABAPENTIN 400 MG/1
400 CAPSULE ORAL 3 TIMES DAILY
Qty: 90 CAPSULE | Refills: 2 | Status: SHIPPED | OUTPATIENT
Start: 2022-12-01 | End: 2023-02-13 | Stop reason: SDUPTHER

## 2022-12-01 RX ORDER — HYDROCODONE BITARTRATE AND ACETAMINOPHEN 10; 325 MG/1; MG/1
1 TABLET ORAL EVERY 8 HOURS PRN
Qty: 90 TABLET | Refills: 0 | Status: SHIPPED | OUTPATIENT
Start: 2022-12-01 | End: 2023-02-13 | Stop reason: SDUPTHER

## 2022-12-01 RX ORDER — POTASSIUM CHLORIDE 1500 MG/1
20 TABLET, FILM COATED, EXTENDED RELEASE ORAL DAILY
COMMUNITY
Start: 2022-10-31

## 2022-12-01 RX ORDER — APIXABAN 5 MG/1
5 TABLET, FILM COATED ORAL 2 TIMES DAILY
Qty: 60 TABLET | Refills: 5 | Status: SHIPPED | OUTPATIENT
Start: 2022-12-01

## 2022-12-01 RX ORDER — MEGESTROL ACETATE 40 MG/ML
800 SUSPENSION ORAL DAILY
Qty: 600 ML | Refills: 3 | Status: SHIPPED | OUTPATIENT
Start: 2022-12-01

## 2022-12-01 NOTE — ASSESSMENT & PLAN NOTE
Patient is down to 103 pounds.  Her Blood work 3 months ago was not worrisome.  We will start her on some Megace.  She does not gain weight next month we will recheck Blood work.  Her caregivers are going to encourage her p.o. intake.

## 2022-12-01 NOTE — PROGRESS NOTES
Patient Name: Emily Green  : 1935   MRN: [id number]     Chief Complaint:    Chief Complaint   Patient presents with   • Hypotension     Pt here for low blood pressure    • Med Refill     Pt here for medication refills and recheck       History of Present Illness: Emily Green is a 87 y.o. female who is here today for follow up on pain meds  HPI        Review of Systems:   Review of Systems   Constitutional: Positive for unexpected weight change.   HENT: Negative.    Eyes: Negative.    Respiratory: Negative.    Cardiovascular: Negative.    Gastrointestinal: Negative.    Neurological: Negative.         Past Medical History:   Past Medical History:   Diagnosis Date   • Allergy     SEASONAL   • Anemia 2017   • Anxiety    • Arthritis    • Atrial fibrillation ([facility]) 2017    CHRONIC   • Auditory effects     DECREASED AUDITORY ACUITY   • C. difficile colitis    • Cardiomegaly    • Carotid artery stenosis    • Carpal tunnel syndrome    • CHF (congestive heart failure) ([facility]) 2017   • Chronic diarrhea    • Chronic neck pain    • Chronic obstructive lung disease ([facility])    • Chronic obstructive lung disease ([facility]) 6/15/2022   • CVA (cerebral vascular accident) ([facility])    • Degenerative arthritis of hip     left, not a candidate for operation   • Dementia ([facility])    • Dementia ([facility])    • Depression    • Dizziness    • Dysrhythmias     CARDIAC   • Early cataract    • Fatigue     CHRONIC   • Gastroesophageal reflux disease without esophagitis    • Headache    • Hereditary sensory neuropathy    • High cholesterol    • High risk medication use    • History of cerebrovascular accident    • History of pulmonary embolus (PE)    • Hyperlipidemia 2017   • Hypertension 2017    ESSENTIAL   • Hypokalemia 2017   • Hypothyroidism 2017    acquired   • Left carotid stenosis 2017   • Long term current use of anticoagulant    • Neuropathy    • Osteoporosis    • Paroxysmal atrial  fibrillation (HCC)    • Peripheral vascular disease (HCC)    • Posterior circulation stroke (HCC)     WITH VERTIGO   • Pulmonary hypertension (HCC)    • Recurrent major depression in partial remission (HCC)    • Seizures (HCC)     Thinks may have hx seizures   • 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   • Thyroid disease    • TIA (transient ischemic attack)     history   • Vertebral artery occlusion, left    • Vertigo        Past Surgical History:   Past Surgical History:   Procedure Laterality Date   • ANGIOPLASTY  2011    OF ARTERY OF LOWER EXTREMITY   • APPENDECTOMY     • CAROTID ENDARTERECTOMY Right     ~1999 deborah   • HEMORRHOIDECTOMY     • HYSTERECTOMY     • OTHER SURGICAL HISTORY  11/2017    TRANSCATHETER AORTIC VALVE REPLACEMENT   • OTHER SURGICAL HISTORY  2004    BLOOD TRANSFUSION   • REPLACEMENT TOTAL HIP LATERAL POSITION Right     UNKNOWN DETAILS 2008, 2004   • TOTAL ABDOMINAL HYSTERECTOMY WITH SALPINGO OOPHORECTOMY      AT AGE 38       Family History:   Family History   Problem Relation Age of Onset   • High cholesterol Mother    • Osteoporosis Mother    • Stroke Mother    • Other Mother         CARDIOVASCULAR DISEASE   • Hearing loss Mother    • Heart disease Mother    • Hypertension Mother    • Arthritis Mother    • Other Father         CARDIOVASCULAR DISEASE   • Heart disease Father    • Hypertension Father    • Arthritis Father    • High cholesterol Father    • Osteoporosis Father    • Hearing loss Father    • Irritable bowel syndrome Father    • Stroke Father    • Hearing loss Sister    • Hypertension Sister    • High cholesterol Sister    • Osteoporosis Sister    • Arthritis Brother    • Other Brother         CARDIOVASCULAR DISEASE   • Kidney nephrosis Daughter    • Hypertension Other    • Cancer Other         GASTRIC   • Cancer Other         COLON CANCER       Social History:   Social History     Socioeconomic History   • Marital status:    Tobacco Use   • Smoking status: Never   • Smokeless  tobacco: Never   Substance and Sexual Activity   • Alcohol use: Never   • Drug use: Never   • Sexual activity: Defer     Partners: Female       Medications:     Current Outpatient Medications:   •  alendronate (FOSAMAX) 70 MG tablet, Take 1 tablet by mouth 1 (One) Time Per Week., Disp: 12 tablet, Rfl: 0  •  ambrisentan (LETAIRIS) 10 MG tablet, ambrisentan 10 mg tablet, Disp: , Rfl:   •  AMLODIPINE BESYLATE PO, Take 5 mg by mouth Daily., Disp: , Rfl:   •  Ascorbic Acid 500 MG chewable tablet, Strawberry C 500 mg chewable tablet  TAKE 1 CAPLET BY MOUTH TWICE A DAY. OTC NOT COVERED., Disp: , Rfl:   •  atorvastatin (LIPITOR) 20 MG tablet, Take 1 tablet by mouth Daily., Disp: 30 tablet, Rfl: 11  •  benzonatate (TESSALON) 100 MG capsule, benzonatate 100 mg capsule  TAKE 1 CAPSULE BY MOUTH THREE TIMES A DAY AS NEEDED FOR COUGH. NOT COVERED, Disp: , Rfl:   •  carvedilol (COREG) 3.125 MG tablet, TAKE 1 TABLET BY MOUTH TWICE A DAY WITH FOOD, Disp: 180 tablet, Rfl: 1  •  Cholecalciferol 50 MCG (2000 UT) capsule, Vitamin D3 50 mcg (2,000 unit) capsule  Daily, Disp: , Rfl:   •  dexamethasone (DECADRON) 4 MG tablet, dexamethasone 4 mg tablet  TAKE 1 AND 1/2 TABLETS BY MOUTH EVERY DAY, Disp: , Rfl:   •  Eliquis 5 MG tablet tablet, Take 1 tablet by mouth 2 (Two) Times a Day., Disp: 60 tablet, Rfl: 5  •  escitalopram (LEXAPRO) 20 MG tablet, TAKE 1 TABLET BY MOUTH EVERY DAY, Disp: 90 tablet, Rfl: 1  •  famotidine (PEPCID) 40 MG tablet, Take 1 tablet by mouth every night at bedtime., Disp: 90 tablet, Rfl: 0  •  ferrous sulfate 325 (65 FE) MG tablet, TAKE 1 TABLET BY MOUTH THREE TIMES A DAY, Disp: 90 tablet, Rfl: 5  •  furosemide (LASIX) 20 MG tablet, Take 1 tablet by mouth Every 12 (Twelve) Hours., Disp: 60 tablet, Rfl: 11  •  gabapentin (NEURONTIN) 400 MG capsule, Take 1 capsule by mouth 3 (Three) Times a Day., Disp: 90 capsule, Rfl: 2  •  guaiFENesin (MUCINEX) 600 MG 12 hr tablet, Mucinex 600 mg tablet, extended release  TAKE 1  "TABLET BY MOUTH EVERY 12 HOURS AS NEEDED, Disp: , Rfl:   •  HYDROcodone-acetaminophen (NORCO)  MG per tablet, Take 1 tablet by mouth Every 8 (Eight) Hours As Needed for Moderate Pain., Disp: 90 tablet, Rfl: 0  •  KLOR-CON 20 MEQ CR tablet, TAKE 1 TABLET BY MOUTH EVERY DAY, Disp: 90 tablet, Rfl: 1  •  Lactobacillus-Inulin (Culturelle Adult Ult Balance) capsule, Probiotic Formula 10 billion cell(2 billion ea) capsule, Disp: , Rfl:   •  meclizine (ANTIVERT) 25 MG tablet, Take 1 tablet by mouth 3 (Three) Times a Day As Needed for Dizziness., Disp: 90 tablet, Rfl: 11  •  nitroglycerin (NITROSTAT) 0.4 MG SL tablet, nitroglycerin 0.4 mg sublingual tablet, Disp: , Rfl:   •  pantoprazole (PROTONIX) 40 MG EC tablet, Take 1 tablet by mouth Daily., Disp: 90 tablet, Rfl: 0  •  potassium chloride ER (K-TAB) 20 MEQ tablet controlled-release ER tablet, Take 20 mEq by mouth Daily. with food, Disp: , Rfl:   •  sildenafil (REVATIO) 20 MG tablet, TAKE 1 TABLET (20 MG) BY MOUTH 3 TIMES PER DAY, Disp: , Rfl:   •  spironolactone (ALDACTONE) 25 MG tablet, TAKE 1 TABLET BY MOUTH EVERY DAY, Disp: 90 tablet, Rfl: 1  •  zinc sulfate (ZINCATE) 220 (50 Zn) MG capsule, zinc sulfate 50 mg zinc (220 mg) capsule  TAKE 1 CAPSULE BY MOUTH TWICE A DAY. OTC NOT COVER, Disp: , Rfl:   •  megestrol (MEGACE) 40 MG/ML suspension, Take 20 mL by mouth Daily., Disp: 600 mL, Rfl: 3    Allergies:   Allergies   Allergen Reactions   • Codeine Nausea And Vomiting   • Nitrofurantoin    • Nitrofurantoin Macrocrystal Nausea And Vomiting         Physical Exam:  Vital Signs:   Vitals:    12/01/22 1125   BP: 90/58   BP Location: Left arm   Patient Position: Sitting   Cuff Size: Adult   Pulse: 68   Resp: 12   Temp: 96.8 °F (36 °C)   TempSrc: Temporal   SpO2: 97%   Weight: 46.8 kg (103 lb 3.2 oz)   Height: 162.6 cm (64\")   PainSc: 0-No pain     Body mass index is 17.71 kg/m².     Physical Exam  Vitals and nursing note reviewed.   Constitutional:       Appearance: " Normal appearance. She is ill-appearing.   HENT:      Head: Normocephalic and atraumatic.      Right Ear: Tympanic membrane, ear canal and external ear normal.      Left Ear: Tympanic membrane, ear canal and external ear normal.      Nose: Nose normal.      Mouth/Throat:      Mouth: Mucous membranes are dry.      Pharynx: Oropharynx is clear.   Eyes:      Extraocular Movements: Extraocular movements intact.      Conjunctiva/sclera: Conjunctivae normal.      Pupils: Pupils are equal, round, and reactive to light.   Cardiovascular:      Rate and Rhythm: Normal rate and regular rhythm.      Pulses: Normal pulses.      Heart sounds: Normal heart sounds.   Pulmonary:      Effort: Pulmonary effort is normal.      Breath sounds: Normal breath sounds.   Musculoskeletal:      Cervical back: Normal range of motion and neck supple.   Feet:      Comments:      Neurological:      Mental Status: She is alert.         Procedures      Assessment/Plan:   Diagnoses and all orders for this visit:    1. Loss of weight (Primary)  Assessment & Plan:  Patient is down to 103 pounds.  Her Blood work 3 months ago was not worrisome.  We will start her on some Megace.  She does not gain weight next month we will recheck Blood work.  Her caregivers are going to encourage her p.o. intake.      2. Neuropathy  Assessment & Plan:  Refill gabapentin.  We will also refill hydrocodone    Orders:  -     gabapentin (NEURONTIN) 400 MG capsule; Take 1 capsule by mouth 3 (Three) Times a Day.  Dispense: 90 capsule; Refill: 2    3. Other chronic pain  -     HYDROcodone-acetaminophen (NORCO)  MG per tablet; Take 1 tablet by mouth Every 8 (Eight) Hours As Needed for Moderate Pain.  Dispense: 90 tablet; Refill: 0    Other orders  -     Eliquis 5 MG tablet tablet; Take 1 tablet by mouth 2 (Two) Times a Day.  Dispense: 60 tablet; Refill: 5  -     megestrol (MEGACE) 40 MG/ML suspension; Take 20 mL by mouth Daily.  Dispense: 600 mL; Refill: 3           Follow  Up:   Return in about 3 months (around 3/1/2023).    Zeeshan Ross MD  [facility]

## 2023-02-13 DIAGNOSIS — G89.29 OTHER CHRONIC PAIN: ICD-10-CM

## 2023-02-13 DIAGNOSIS — G62.9 NEUROPATHY: ICD-10-CM

## 2023-02-13 RX ORDER — GABAPENTIN 400 MG/1
400 CAPSULE ORAL 3 TIMES DAILY
Qty: 90 CAPSULE | Refills: 2 | Status: SHIPPED | OUTPATIENT
Start: 2023-02-13 | End: 2023-02-17 | Stop reason: SDUPTHER

## 2023-02-13 RX ORDER — HYDROCODONE BITARTRATE AND ACETAMINOPHEN 10; 325 MG/1; MG/1
1 TABLET ORAL EVERY 8 HOURS PRN
Qty: 90 TABLET | Refills: 0 | Status: SHIPPED | OUTPATIENT
Start: 2023-02-13 | End: 2023-02-17 | Stop reason: SDUPTHER

## 2023-02-13 NOTE — TELEPHONE ENCOUNTER
Caller: GREYSONOFELIALEO ABEBE    Relationship: Emergency Contact    Best call back number: [phone]    Requested Prescriptions:   Requested Prescriptions     Pending Prescriptions Disp Refills   • HYDROcodone-acetaminophen (NORCO)  MG per tablet 90 tablet 0     Sig: Take 1 tablet by mouth Every 8 (Eight) Hours As Needed for Moderate Pain.   • gabapentin (NEURONTIN) 400 MG capsule 90 capsule 2     Sig: Take 1 capsule by mouth 3 (Three) Times a Day.        Pharmacy where request should be sent: [facility]/PHARMACY #[id number] [address] [phone] [facility] [phone]      Additional details provided by patient:     Does the patient have less than a 3 day supply:  [] Yes  [x] No    Would you like a call back once the refill request has been completed: [x] Yes [] No    If the office needs to give you a call back, can they leave a voicemail: [x] Yes [] No    Genaro Ruiz Rep   02/13/23 12:56 EST

## 2023-02-13 NOTE — TELEPHONE ENCOUNTER
Rx Refill Note    Requested Prescriptions     Pending Prescriptions Disp Refills   • HYDROcodone-acetaminophen (NORCO)  MG per tablet 90 tablet 0     Sig: Take 1 tablet by mouth Every 8 (Eight) Hours As Needed for Moderate Pain.   • gabapentin (NEURONTIN) 400 MG capsule 90 capsule 2     Sig: Take 1 capsule by mouth 3 (Three) Times a Day.        Last office visit with prescribing clinician: 12/1/2022      Next office visit with prescribing clinician: 3/1/2023   Last labs:   Last refill:    Pharmacy [facility]

## 2023-02-17 DIAGNOSIS — G89.29 OTHER CHRONIC PAIN: ICD-10-CM

## 2023-02-17 DIAGNOSIS — G62.9 NEUROPATHY: ICD-10-CM

## 2023-02-17 NOTE — TELEPHONE ENCOUNTER
Caller: GREYSONOFELIA    Relationship: Emergency Contact    Best call back number: [phone]    Requested Prescriptions:   Requested Prescriptions     Pending Prescriptions Disp Refills   • HYDROcodone-acetaminophen (NORCO)  MG per tablet 90 tablet 0     Sig: Take 1 tablet by mouth Every 8 (Eight) Hours As Needed for Moderate Pain.   • gabapentin (NEURONTIN) 400 MG capsule 90 capsule 2     Sig: Take 1 capsule by mouth 3 (Three) Times a Day.        Pharmacy where request should be sent: [facility]/PHARMACY #[id number] [address] [phone] [facility] [phone]      Additional details provided by patient: OUT OF MEDICATION   Does the patient have less than a 3 day supply:  [x] Yes  [] No    Would you like a call back once the refill request has been completed: [] Yes [x] No    If the office needs to give you a call back, can they leave a voicemail: [] Yes [x] No    Genaro Velasquez Rep   02/17/23 16:03 EST

## 2023-02-20 ENCOUNTER — TELEPHONE (OUTPATIENT)
Dept: FAMILY MEDICINE CLINIC | Facility: CLINIC | Age: 88
End: 2023-02-20

## 2023-02-20 RX ORDER — GABAPENTIN 400 MG/1
400 CAPSULE ORAL 3 TIMES DAILY
Qty: 90 CAPSULE | Refills: 2 | Status: SHIPPED | OUTPATIENT
Start: 2023-02-20 | End: 2023-03-27 | Stop reason: SDUPTHER

## 2023-02-20 RX ORDER — HYDROCODONE BITARTRATE AND ACETAMINOPHEN 10; 325 MG/1; MG/1
1 TABLET ORAL EVERY 8 HOURS PRN
Qty: 90 TABLET | Refills: 0 | Status: SHIPPED | OUTPATIENT
Start: 2023-02-20 | End: 2023-03-27 | Stop reason: SDUPTHER

## 2023-02-20 NOTE — TELEPHONE ENCOUNTER
Rx Refill Note    Requested Prescriptions     Pending Prescriptions Disp Refills   • HYDROcodone-acetaminophen (NORCO)  MG per tablet 90 tablet 0     Sig: Take 1 tablet by mouth Every 8 (Eight) Hours As Needed for Moderate Pain.   • gabapentin (NEURONTIN) 400 MG capsule 90 capsule 2     Sig: Take 1 capsule by mouth 3 (Three) Times a Day.        Last office visit with prescribing clinician: 12/1/2022      Next office visit with prescribing clinician: 3/1/2023   Last labs:   Last refill:    Pharmacy [facility]

## 2023-02-27 DIAGNOSIS — D64.9 ANEMIA, UNSPECIFIED TYPE: ICD-10-CM

## 2023-02-27 DIAGNOSIS — K21.9 GASTROESOPHAGEAL REFLUX DISEASE WITHOUT ESOPHAGITIS: ICD-10-CM

## 2023-02-27 RX ORDER — FUROSEMIDE 20 MG/1
TABLET ORAL
Qty: 180 TABLET | Refills: 0 | Status: SHIPPED | OUTPATIENT
Start: 2023-02-27

## 2023-02-27 RX ORDER — FERROUS SULFATE 325(65) MG
TABLET ORAL
Qty: 270 TABLET | Refills: 0 | Status: SHIPPED | OUTPATIENT
Start: 2023-02-27

## 2023-02-27 RX ORDER — ATORVASTATIN CALCIUM 20 MG/1
TABLET, FILM COATED ORAL
Qty: 90 TABLET | Refills: 0 | Status: SHIPPED | OUTPATIENT
Start: 2023-02-27

## 2023-02-27 RX ORDER — FAMOTIDINE 40 MG/1
TABLET, FILM COATED ORAL
Qty: 90 TABLET | Refills: 0 | Status: SHIPPED | OUTPATIENT
Start: 2023-02-27

## 2023-02-27 NOTE — TELEPHONE ENCOUNTER
Rx Refill Note    Requested Prescriptions     Pending Prescriptions Disp Refills   • famotidine (PEPCID) 40 MG tablet [Pharmacy Med Name: FAMOTIDINE 40 MG TABLET] 90 tablet 0     Sig: TAKE 1 TABLET BY MOUTH EVERYDAY AT BEDTIME   • ferrous sulfate 325 (65 FE) MG tablet [Pharmacy Med Name: FERROUS SULFATE 325 MG TABLET] 270 tablet 0     Sig: TAKE 1 TABLET BY MOUTH THREE TIMES A DAY   • atorvastatin (LIPITOR) 20 MG tablet [Pharmacy Med Name: ATORVASTATIN 20 MG TABLET] 90 tablet 0     Sig: TAKE 1 TABLET BY MOUTH EVERY DAY   • furosemide (LASIX) 20 MG tablet [Pharmacy Med Name: FUROSEMIDE 20 MG TABLET] 180 tablet 0     Sig: TAKE 1 TABLET BY MOUTH EVERY 12 HOURS        Last office visit with prescribing clinician: 12/1/2022      Next office visit with prescribing clinician: 3/1/2023   Last labs:   Last refill: needs   Pharmacy (be sure to add in Epic). correct

## 2023-03-02 DIAGNOSIS — N32.81 OVERACTIVE BLADDER: ICD-10-CM

## 2023-03-02 NOTE — TELEPHONE ENCOUNTER
Rx Refill Note    Requested Prescriptions     Pending Prescriptions Disp Refills   • alendronate (FOSAMAX) 70 MG tablet [Pharmacy Med Name: ALENDRONATE SODIUM 70 MG TAB] 12 tablet 0     Sig: TAKE 1 TABLET BY MOUTH 1 (ONE) TIME PER WEEK.        Last office visit with prescribing clinician: 12/1/2022      Next office visit with prescribing clinician: 3/13/2023   Last labs:   Last refill:    Pharmacy [facility]

## 2023-03-05 RX ORDER — ALENDRONATE SODIUM 70 MG/1
70 TABLET ORAL WEEKLY
Qty: 12 TABLET | Refills: 0 | Status: SHIPPED | OUTPATIENT
Start: 2023-03-05

## 2023-03-13 ENCOUNTER — TELEMEDICINE (OUTPATIENT)
Dept: FAMILY MEDICINE CLINIC | Facility: CLINIC | Age: 88
End: 2023-03-13
Payer: MEDICARE

## 2023-03-13 DIAGNOSIS — G89.29 OTHER CHRONIC PAIN: Primary | ICD-10-CM

## 2023-03-27 ENCOUNTER — TELEMEDICINE (OUTPATIENT)
Dept: FAMILY MEDICINE CLINIC | Facility: CLINIC | Age: 88
End: 2023-03-27
Payer: MEDICARE

## 2023-03-27 VITALS
HEART RATE: 83 BPM | HEIGHT: 64 IN | WEIGHT: 109.6 LBS | DIASTOLIC BLOOD PRESSURE: 79 MMHG | BODY MASS INDEX: 18.71 KG/M2 | SYSTOLIC BLOOD PRESSURE: 137 MMHG | TEMPERATURE: 98 F | RESPIRATION RATE: 14 BRPM | OXYGEN SATURATION: 98 %

## 2023-03-27 DIAGNOSIS — H81.13 BENIGN PAROXYSMAL POSITIONAL VERTIGO DUE TO BILATERAL VESTIBULAR DISORDER: Primary | ICD-10-CM

## 2023-03-27 DIAGNOSIS — G89.29 OTHER CHRONIC PAIN: ICD-10-CM

## 2023-03-27 DIAGNOSIS — G62.9 NEUROPATHY: ICD-10-CM

## 2023-03-27 DIAGNOSIS — N18.4 CKD (CHRONIC KIDNEY DISEASE) STAGE 4, GFR 15-29 ML/MIN: ICD-10-CM

## 2023-03-27 DIAGNOSIS — D64.9 ANEMIA, UNSPECIFIED TYPE: ICD-10-CM

## 2023-03-27 PROCEDURE — 99214 OFFICE O/P EST MOD 30 MIN: CPT | Performed by: FAMILY MEDICINE

## 2023-03-27 RX ORDER — MECLIZINE HYDROCHLORIDE 25 MG/1
25 TABLET ORAL 3 TIMES DAILY PRN
Qty: 90 TABLET | Refills: 11 | Status: SHIPPED | OUTPATIENT
Start: 2023-03-27

## 2023-03-27 RX ORDER — GABAPENTIN 400 MG/1
400 CAPSULE ORAL 3 TIMES DAILY
Qty: 90 CAPSULE | Refills: 2 | Status: SHIPPED | OUTPATIENT
Start: 2023-03-27

## 2023-03-27 RX ORDER — HYDROCODONE BITARTRATE AND ACETAMINOPHEN 10; 325 MG/1; MG/1
1 TABLET ORAL EVERY 8 HOURS PRN
Qty: 90 TABLET | Refills: 0 | Status: SHIPPED | OUTPATIENT
Start: 2023-03-27

## 2023-03-27 NOTE — ASSESSMENT & PLAN NOTE
Patient has done well on meclizine in the past we will refill it for her.  Return to clinic if she gets worse.  Or if she gets a headache.

## 2023-03-27 NOTE — TELEPHONE ENCOUNTER
Rx Refill Note    Requested Prescriptions      No prescriptions requested or ordered in this encounter        Last office visit with prescribing clinician: 12/1/2022      Next office visit with prescribing clinician: 03/27/2023  Last labs:   Last refill:    Pharmacy

## 2023-03-27 NOTE — PROGRESS NOTES
Telehealth E-Visit      Patient Name: Emily Green  : 1935   MRN: [id number]     Chief Complaint:    Chief Complaint   Patient presents with   • Med Refill     Pt doing a my chart for medication refills and recheck       I have reviewed the E-Visit questionnaire and the patient's answers, my assessment and plan are listed below.   You have chosen to receive care through a telehealth visit.  Do you consent to use a video/audio connection for your medical care today? yes    History of Present Illness: Emily Green is a 88 y.o. female who is here today to follow up with Rf of her Hydrocodone      Subjective      Review of Systems:   Review of Systems   Constitutional: Negative.    HENT: Negative.    Eyes: Negative.    Respiratory: Negative.    Cardiovascular: Negative.    Gastrointestinal: Negative.    Neurological: Negative.        I have reviewed and the following portions of the patient's history were updated as appropriate: past family history, past medical history, past social history, past surgical history and problem list.    Medications:     Current Outpatient Medications:   •  alendronate (FOSAMAX) 70 MG tablet, TAKE 1 TABLET BY MOUTH 1 (ONE) TIME PER WEEK., Disp: 12 tablet, Rfl: 0  •  ambrisentan (LETAIRIS) 10 MG tablet, ambrisentan 10 mg tablet, Disp: , Rfl:   •  AMLODIPINE BESYLATE PO, Take 5 mg by mouth Daily., Disp: , Rfl:   •  Ascorbic Acid 500 MG chewable tablet, Strawberry C 500 mg chewable tablet  TAKE 1 CAPLET BY MOUTH TWICE A DAY. OTC NOT COVERED., Disp: , Rfl:   •  atorvastatin (LIPITOR) 20 MG tablet, TAKE 1 TABLET BY MOUTH EVERY DAY, Disp: 90 tablet, Rfl: 0  •  benzonatate (TESSALON) 100 MG capsule, benzonatate 100 mg capsule  TAKE 1 CAPSULE BY MOUTH THREE TIMES A DAY AS NEEDED FOR COUGH. NOT COVERED, Disp: , Rfl:   •  carvedilol (COREG) 3.125 MG tablet, TAKE 1 TABLET BY MOUTH TWICE A DAY WITH FOOD, Disp: 180 tablet, Rfl: 1  •  Cholecalciferol 50 MCG (2000) capsule, Vitamin D3 50  mcg (2,000 unit) capsule  Daily, Disp: , Rfl:   •  dexamethasone (DECADRON) 4 MG tablet, dexamethasone 4 mg tablet  TAKE 1 AND 1/2 TABLETS BY MOUTH EVERY DAY, Disp: , Rfl:   •  Eliquis 5 MG tablet tablet, Take 1 tablet by mouth 2 (Two) Times a Day., Disp: 60 tablet, Rfl: 5  •  escitalopram (LEXAPRO) 20 MG tablet, TAKE 1 TABLET BY MOUTH EVERY DAY, Disp: 90 tablet, Rfl: 1  •  famotidine (PEPCID) 40 MG tablet, TAKE 1 TABLET BY MOUTH EVERYDAY AT BEDTIME, Disp: 90 tablet, Rfl: 0  •  ferrous sulfate 325 (65 FE) MG tablet, TAKE 1 TABLET BY MOUTH THREE TIMES A DAY, Disp: 270 tablet, Rfl: 0  •  furosemide (LASIX) 20 MG tablet, TAKE 1 TABLET BY MOUTH EVERY 12 HOURS, Disp: 180 tablet, Rfl: 0  •  gabapentin (NEURONTIN) 400 MG capsule, Take 1 capsule by mouth 3 (Three) Times a Day., Disp: 90 capsule, Rfl: 2  •  guaiFENesin (MUCINEX) 600 MG 12 hr tablet, Mucinex 600 mg tablet, extended release  TAKE 1 TABLET BY MOUTH EVERY 12 HOURS AS NEEDED, Disp: , Rfl:   •  HYDROcodone-acetaminophen (NORCO)  MG per tablet, Take 1 tablet by mouth Every 8 (Eight) Hours As Needed for Moderate Pain., Disp: 90 tablet, Rfl: 0  •  KLOR-CON 20 MEQ CR tablet, TAKE 1 TABLET BY MOUTH EVERY DAY, Disp: 90 tablet, Rfl: 1  •  Lactobacillus-Inulin (Culturelle Adult Ult Balance) capsule, Probiotic Formula 10 billion cell(2 billion ea) capsule, Disp: , Rfl:   •  megestrol (MEGACE) 40 MG/ML suspension, Take 20 mL by mouth Daily., Disp: 600 mL, Rfl: 3  •  nitroglycerin (NITROSTAT) 0.4 MG SL tablet, nitroglycerin 0.4 mg sublingual tablet, Disp: , Rfl:   •  potassium chloride ER (K-TAB) 20 MEQ tablet controlled-release ER tablet, Take 1 tablet by mouth Daily. with food, Disp: , Rfl:   •  sildenafil (REVATIO) 20 MG tablet, TAKE 1 TABLET (20 MG) BY MOUTH 3 TIMES PER DAY, Disp: , Rfl:   •  spironolactone (ALDACTONE) 25 MG tablet, TAKE 1 TABLET BY MOUTH EVERY DAY, Disp: 90 tablet, Rfl: 1  •  zinc sulfate (ZINCATE) 220 (50 Zn) MG capsule, zinc sulfate 50 mg zinc  "(220 mg) capsule  TAKE 1 CAPSULE BY MOUTH TWICE A DAY. OTC NOT COVER, Disp: , Rfl:   •  meclizine (ANTIVERT) 25 MG tablet, Take 1 tablet by mouth 3 (Three) Times a Day As Needed for Dizziness., Disp: 90 tablet, Rfl: 11    Allergies:   Allergies   Allergen Reactions   • Codeine Nausea And Vomiting   • Nitrofurantoin    • Nitrofurantoin Macrocrystal Nausea And Vomiting       Objective     Physical Exam:  Vital Signs:   Vitals:    03/27/23 1443   BP: 137/79  Comment: home monitor   BP Location: Left arm   Patient Position: Sitting   Cuff Size: Adult   Pulse: 83   Resp: 14   Temp: 98 °F (36.7 °C)   TempSrc: Temporal   SpO2: 98%   Weight: 49.7 kg (109 lb 9.6 oz)   Height: 162.6 cm (64\")   PainSc: 0-No pain     Body mass index is 18.81 kg/m².    Physical Exam  Constitutional:       Appearance: Normal appearance.   Psychiatric:         Mood and Affect: Mood normal.         Behavior: Behavior normal.         Thought Content: Thought content normal.         Judgment: Judgment normal.           Assessment / Plan      Assessment/Plan:   Diagnoses and all orders for this visit:    1. Benign paroxysmal positional vertigo due to bilateral vestibular disorder (Primary)  Assessment & Plan:  Patient has done well on meclizine in the past we will refill it for her.  Return to clinic if she gets worse.  Or if she gets a headache.      2. Other chronic pain  Assessment & Plan:  Refill hydrocodone.  This has been controlling her symptoms well.  We will follow.      3. Neuropathy  Assessment & Plan:  Refill gabapentin.  Recheck in 3 months.      4. Anemia, unspecified type  Assessment & Plan:  Probably secondary to CKD 4.  Recheck in 3 months.      5. CKD (chronic kidney disease) stage 4, GFR 15-29 ml/min ([facility])  Assessment & Plan:  Patient is instructed to not take any NSAIDs.  Medicines as directed.  Stay well-hydrated.        Other orders  -     meclizine (ANTIVERT) 25 MG tablet; Take 1 tablet by mouth 3 (Three) Times a Day As Needed " for Dizziness.  Dispense: 90 tablet; Refill: 11       1.      Follow Up:   No follow-ups on file.    Any medications prescribed have been sent electronically to   [facility]/pharmacy #[id number] - Closed - [place], [place] - [address] - [phone]  - [phone] FX  [address] [facility] [address]  Phone: [phone] Fax: [phone]    [facility] - [place], [place] - [address] [place] [address] - [phone]  - [phone] FX  [address]  [address]  [facility] [address]  Phone: [phone] Fax: [phone]      15 minutes were spent reviewing the patient's questionnaire, formulating a treatment plan, and relaying information to the patient via [employer].    Zeeshan Ross MD  [facility] [place]   03/27/23  14:18 EDT

## 2023-03-27 NOTE — TELEPHONE ENCOUNTER
Rx Refill Note    Requested Prescriptions     Pending Prescriptions Disp Refills   • HYDROcodone-acetaminophen (NORCO)  MG per tablet 90 tablet 0     Sig: Take 1 tablet by mouth Every 8 (Eight) Hours As Needed for Moderate Pain.   • gabapentin (NEURONTIN) 400 MG capsule 90 capsule 2     Sig: Take 1 capsule by mouth 3 (Three) Times a Day.        Last office visit with prescribing clinician: 12/1/2022      Next office visit with prescribing clinician: 03/27/2023  Last labs:   Last refill:    Pharmacy   [facility]

## 2023-07-21 DIAGNOSIS — F41.9 ANXIETY: ICD-10-CM

## 2023-07-21 DIAGNOSIS — K21.9 GASTROESOPHAGEAL REFLUX DISEASE WITHOUT ESOPHAGITIS: ICD-10-CM

## 2023-07-21 DIAGNOSIS — D64.9 ANEMIA, UNSPECIFIED TYPE: ICD-10-CM

## 2023-07-21 DIAGNOSIS — G62.9 NEUROPATHY: ICD-10-CM

## 2023-07-21 DIAGNOSIS — N32.81 OVERACTIVE BLADDER: ICD-10-CM

## 2023-07-21 DIAGNOSIS — G89.29 OTHER CHRONIC PAIN: ICD-10-CM

## 2023-07-21 NOTE — TELEPHONE ENCOUNTER
Caller: PITOPRITI CLEMENTS    Relationship: Caregiver (non-relative)    Best call back number: [phone]     Requested Prescriptions:   Requested Prescriptions     Pending Prescriptions Disp Refills    gabapentin (NEURONTIN) 400 MG capsule 90 capsule 2     Sig: Take 1 capsule by mouth 3 (Three) Times a Day.    HYDROcodone-acetaminophen (NORCO)  MG per tablet 90 tablet 0     Sig: Take 1 tablet by mouth Every 8 (Eight) Hours As Needed for Moderate Pain.        Pharmacy where request should be sent: [facility]/PHARMACY #[id number] [facility] [address] [phone]  - [phone] FX     Last office visit with prescribing clinician: 7/20/2023   Last telemedicine visit with prescribing clinician: 3/27/2023   Next office visit with prescribing clinician: 10/23/2023     Additional details provided by patient: OUT OF MEDICATION MAY CALL BACK WITH MORE NEEDED     Does the patient have less than a 3 day supply:  [x] Yes  [] No    Would you like a call back once the refill request has been completed: [] Yes [x] No    If the office needs to give you a call back, can they leave a voicemail: [] Yes [x] No    Genaro Velasquez   07/21/23 14:28 EDT

## 2023-07-24 NOTE — TELEPHONE ENCOUNTER
Patient's medications were sent to [facility].  Patient was seen on 7/20.    Patient's caretaker, Lisha, says she has never used that pharmacy.    Please cancel that order and delete that pharmacy from patient's chart asap, and send to [facility].    Patient has been out of her BP med for two weeks now.    Please advise Lisha when this is corrected.  [phone]

## 2023-07-24 NOTE — TELEPHONE ENCOUNTER
PENNY COLLETT .[phone] CALLED IN REQUESTING THE PATIENTS MEDICATIONS BE SENT TO THE PHARMACY. SHE IS REQUESTING A CALL FROM THE CLINICAL STAFF TO DISCUSS THE MEDICATION.     GONZALEZ STATES SHE NEEDS THIS SENT TO THE PHARMACY TODAY AS THE PATIENT IS OUT OF MEDICATION. SHE STATES THESE ARE THE MEDICATIONS DISCUSSED AT HER MOST RECENT VISIT.

## 2023-07-25 RX ORDER — ALENDRONATE SODIUM 70 MG/1
70 TABLET ORAL WEEKLY
Qty: 12 TABLET | Refills: 0 | Status: SHIPPED | OUTPATIENT
Start: 2023-07-25

## 2023-07-25 RX ORDER — MECLIZINE HYDROCHLORIDE 25 MG/1
25 TABLET ORAL 3 TIMES DAILY PRN
Qty: 90 TABLET | Refills: 11 | Status: SHIPPED | OUTPATIENT
Start: 2023-07-25

## 2023-07-25 RX ORDER — SPIRONOLACTONE 25 MG/1
25 TABLET ORAL DAILY
Qty: 90 TABLET | Refills: 1 | Status: SHIPPED | OUTPATIENT
Start: 2023-07-25

## 2023-07-25 RX ORDER — FERROUS SULFATE 325(65) MG
1 TABLET ORAL 3 TIMES DAILY
Qty: 270 TABLET | Refills: 0 | Status: SHIPPED | OUTPATIENT
Start: 2023-07-25

## 2023-07-25 RX ORDER — FAMOTIDINE 40 MG/1
40 TABLET, FILM COATED ORAL
Qty: 90 TABLET | Refills: 0 | Status: SHIPPED | OUTPATIENT
Start: 2023-07-25

## 2023-07-25 RX ORDER — CARVEDILOL 3.12 MG/1
3.12 TABLET ORAL 2 TIMES DAILY WITH MEALS
Qty: 180 TABLET | Refills: 1 | Status: SHIPPED | OUTPATIENT
Start: 2023-07-25

## 2023-07-25 RX ORDER — POTASSIUM CHLORIDE 20 MEQ/1
20 TABLET, EXTENDED RELEASE ORAL DAILY
Qty: 90 TABLET | Refills: 1 | Status: SHIPPED | OUTPATIENT
Start: 2023-07-25

## 2023-07-25 RX ORDER — HYDROCODONE BITARTRATE AND ACETAMINOPHEN 10; 325 MG/1; MG/1
1 TABLET ORAL EVERY 8 HOURS PRN
Qty: 90 TABLET | Refills: 0 | Status: SHIPPED | OUTPATIENT
Start: 2023-07-25

## 2023-07-25 RX ORDER — ATORVASTATIN CALCIUM 20 MG/1
20 TABLET, FILM COATED ORAL DAILY
Qty: 90 TABLET | Refills: 0 | Status: SHIPPED | OUTPATIENT
Start: 2023-07-25

## 2023-07-25 RX ORDER — SILDENAFIL CITRATE 20 MG/1
20 TABLET ORAL 3 TIMES DAILY
Qty: 90 TABLET | Refills: 5 | Status: SHIPPED | OUTPATIENT
Start: 2023-07-25

## 2023-07-25 RX ORDER — GABAPENTIN 400 MG/1
400 CAPSULE ORAL 3 TIMES DAILY
Qty: 90 CAPSULE | Refills: 2 | Status: SHIPPED | OUTPATIENT
Start: 2023-07-25

## 2023-07-25 RX ORDER — AMLODIPINE BESYLATE 5 MG/1
5 TABLET ORAL DAILY
Qty: 90 TABLET | Refills: 1 | Status: SHIPPED | OUTPATIENT
Start: 2023-07-25

## 2023-07-25 RX ORDER — ESCITALOPRAM OXALATE 20 MG/1
20 TABLET ORAL DAILY
Qty: 90 TABLET | Refills: 1 | Status: SHIPPED | OUTPATIENT
Start: 2023-07-25

## 2023-07-25 RX ORDER — FUROSEMIDE 20 MG/1
20 TABLET ORAL EVERY 12 HOURS
Qty: 180 TABLET | Refills: 0 | Status: SHIPPED | OUTPATIENT
Start: 2023-07-25

## 2023-07-25 NOTE — TELEPHONE ENCOUNTER
Mrs Husain contacted and pt medication was sent to [facility] at the request of Mrs Husain.

## 2023-08-24 ENCOUNTER — READMISSION MANAGEMENT (OUTPATIENT)
Dept: CALL CENTER | Facility: HOSPITAL | Age: 88
End: 2023-08-24
Payer: MEDICARE

## 2023-08-24 NOTE — OUTREACH NOTE
Prep Survey      Flowsheet Row Responses   [facility] facility patient discharged from? Non-BH   Is LACE score < 7 ? Non-BH Discharge   Eligibility [facility]   Date of Discharge 08/24/23   Discharge Disposition Home or Self Care   Discharge diagnosis DYSARTHRIA   Does the patient have one of the following disease processes/diagnoses(primary or secondary)? Other   Prep survey completed? Yes            Nely TOSCANO - Registered Nurse

## 2023-08-25 ENCOUNTER — TRANSITIONAL CARE MANAGEMENT TELEPHONE ENCOUNTER (OUTPATIENT)
Dept: CALL CENTER | Facility: HOSPITAL | Age: 88
End: 2023-08-25
Payer: MEDICARE

## 2023-09-05 ENCOUNTER — TELEPHONE (OUTPATIENT)
Dept: FAMILY MEDICINE CLINIC | Facility: CLINIC | Age: 88
End: 2023-09-05

## 2023-09-05 NOTE — TELEPHONE ENCOUNTER
SHE HAS AN HOSPITAL FOLLOW UP WITH YOU ON THE 11TH OF SEPTEMBER SHE WANTS TO KNOW CAN THIS BE DONE BY TELEHEALTH . THE CAREGIVER CALLS AND SAYS ITS HARD GETTING HER IN AND OUT OF VEHICLE

## 2023-09-14 ENCOUNTER — OFFICE VISIT (OUTPATIENT)
Dept: FAMILY MEDICINE CLINIC | Facility: CLINIC | Age: 88
End: 2023-09-14
Payer: MEDICARE

## 2023-09-14 VITALS
WEIGHT: 111.6 LBS | HEART RATE: 50 BPM | SYSTOLIC BLOOD PRESSURE: 80 MMHG | BODY MASS INDEX: 19.05 KG/M2 | DIASTOLIC BLOOD PRESSURE: 60 MMHG | HEIGHT: 64 IN

## 2023-09-14 DIAGNOSIS — N18.4 CKD (CHRONIC KIDNEY DISEASE) STAGE 4, GFR 15-29 ML/MIN: ICD-10-CM

## 2023-09-14 DIAGNOSIS — D64.9 ANEMIA, UNSPECIFIED TYPE: ICD-10-CM

## 2023-09-14 DIAGNOSIS — Z79.899 HIGH RISK MEDICATION USE: ICD-10-CM

## 2023-09-14 DIAGNOSIS — K59.09 CHRONIC CONSTIPATION: ICD-10-CM

## 2023-09-14 DIAGNOSIS — I50.32 CHRONIC DIASTOLIC CONGESTIVE HEART FAILURE: Primary | Chronic | ICD-10-CM

## 2023-09-14 PROBLEM — Z95.2 HEART VALVE REPLACED: Status: ACTIVE | Noted: 2021-02-25

## 2023-09-14 PROBLEM — I50.30 DIASTOLIC CHF: Status: ACTIVE | Noted: 2017-03-24

## 2023-09-14 RX ORDER — BUMETANIDE 1 MG/1
1 TABLET ORAL DAILY
COMMUNITY
End: 2023-09-14 | Stop reason: SDUPTHER

## 2023-09-14 RX ORDER — PANTOPRAZOLE SODIUM 40 MG/1
40 TABLET, DELAYED RELEASE ORAL DAILY
COMMUNITY

## 2023-09-14 RX ORDER — HYDROCODONE BITARTRATE AND ACETAMINOPHEN 7.5; 325 MG/1; MG/1
1 TABLET ORAL EVERY 8 HOURS PRN
COMMUNITY
End: 2023-09-14

## 2023-09-14 RX ORDER — CARVEDILOL 6.25 MG/1
6.25 TABLET ORAL 2 TIMES DAILY WITH MEALS
COMMUNITY

## 2023-09-14 RX ORDER — BUMETANIDE 1 MG/1
1 TABLET ORAL DAILY
Qty: 90 TABLET | Refills: 0 | Status: SHIPPED | OUTPATIENT
Start: 2023-09-14

## 2023-09-14 NOTE — PROGRESS NOTES
Patient Office Visit      Patient Name: Emily Green  : 1935   MRN: [id number]     Chief Complaint:    Chief Complaint   Patient presents with    Hospital Follow Up Visit    Congestive Heart Failure    Constipation    Chronic Kidney Disease    acute respiratory failure       History of Present Illness: Emily Green is a 88 y.o. female who is here today with her son and one of her caregivers.  Patient is very hard of hearing and is difficult historian.  Son and caregiver also not sure exactly what medication she has been taking as another caregiver to her medications into her pill trays.  He says she has gained 3 pounds in the past few days but she is also not using the bathroom and I think could be due to constipation.  She is taking a stool softener but no other type of stimulant laxative.  Son and caregiver have not noticed any increased difficulty with breathing.  She was hospitalized [facility] 2023 through 2023.  She presented to the emergency department with increased confusion over several days.  Her discharge diagnoses include acute hypoxic respiratory failure, bilateral pleural effusion, influenza B, acute diastolic heart failure, history of A-fib, chronic thrombocytopenia, acute metabolic encephalopathy, CKD stage IV, hyperlipidemia, left subclavian artery stenosis/celiac artery stenosis/renal artery stenosis, chronic hearing loss.  She had an acute metabolic encephalopathy while hospitalized but this improved prior to discharge.  Medications discontinued were amlodipine 10 mg, carvedilol 3.125 mg, Lasix 20 mg.  Medications that were added were amlodipine 5 mg, bumetanide 1 mg, carvedilol 6.25 mg.  All other home medications remain the same. Caregiver says her blood pressure this morning was 106/64 with a pulse of 80.    Subjective      Review of Systems:         Past Medical History:   Past Medical History:   Diagnosis Date    Allergy     SEASONAL    Anemia  03/24/2017    Anxiety     Arthritis     Atrial fibrillation 03/24/2017    CHRONIC    Auditory effects     DECREASED AUDITORY ACUITY    C. difficile colitis     Cardiomegaly     Carotid artery stenosis     Carpal tunnel syndrome     CHF (congestive heart failure) 03/24/2017    Chronic diarrhea     Chronic neck pain     Chronic obstructive lung disease     Chronic obstructive lung disease 6/15/2022    CKD (chronic kidney disease) stage 4, GFR 15-29 ml/min 3/27/2023    CVA (cerebral vascular accident)     Degenerative arthritis of hip     left, not a candidate for operation    Dementia     Dementia     Depression     Dizziness     Dysrhythmias     CARDIAC    Early cataract     Fatigue     CHRONIC    Gastroesophageal reflux disease without esophagitis     Headache     Hereditary sensory neuropathy     High cholesterol     High risk medication use     History of cerebrovascular accident     History of pulmonary embolus (PE)     Hyperlipidemia 03/24/2017    Hypertension 03/24/2017    ESSENTIAL    Hypokalemia 03/24/2017    Hypothyroidism 03/24/2017    acquired    Left carotid stenosis 03/24/2017    Long term current use of anticoagulant     Neuropathy     Osteoporosis     Paroxysmal atrial fibrillation     Peripheral vascular disease     Posterior circulation stroke     WITH VERTIGO    Pulmonary hypertension     Recurrent major depression in partial remission     Seizures     Thinks may have hx seizures    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    Thyroid disease     TIA (transient ischemic attack)     history    Vertebral artery occlusion, left     Vertigo        Past Surgical History:   Past Surgical History:   Procedure Laterality Date    ANGIOPLASTY  2011    OF ARTERY OF LOWER EXTREMITY    APPENDECTOMY      CAROTID ENDARTERECTOMY Right     ~1999 deborah    HEMORRHOIDECTOMY      HYSTERECTOMY      OTHER SURGICAL HISTORY  11/2017    TRANSCATHETER AORTIC VALVE REPLACEMENT    OTHER SURGICAL HISTORY  2004    BLOOD TRANSFUSION     "REPLACEMENT TOTAL HIP LATERAL POSITION Right     UNKNOWN DETAILS 2008, 2004    TOTAL ABDOMINAL HYSTERECTOMY WITH SALPINGO OOPHORECTOMY      AT AGE 38       Family History:   Family History   Problem Relation Age of Onset    High cholesterol Mother     Osteoporosis Mother     Stroke Mother     Other Mother         CARDIOVASCULAR DISEASE    Hearing loss Mother     Heart disease Mother     Hypertension Mother     Arthritis Mother     Other Father         CARDIOVASCULAR DISEASE    Heart disease Father     Hypertension Father     Arthritis Father     High cholesterol Father     Osteoporosis Father     Hearing loss Father     Irritable bowel syndrome Father     Stroke Father     Hearing loss Sister     Hypertension Sister     High cholesterol Sister     Osteoporosis Sister     Arthritis Brother     Other Brother         CARDIOVASCULAR DISEASE    Kidney nephrosis Daughter     Hypertension Other     Cancer Other         GASTRIC    Cancer Other         COLON CANCER       Social History:   Social History     Socioeconomic History    Marital status:    Tobacco Use    Smoking status: Never    Smokeless tobacco: Never   Vaping Use    Vaping Use: Never used   Substance and Sexual Activity    Alcohol use: Never    Drug use: Never    Sexual activity: Defer     Partners: Female       Allergies:   Allergies   Allergen Reactions    Codeine Nausea And Vomiting    Nitrofurantoin     Nitrofurantoin Macrocrystal Nausea And Vomiting    Sulfa Antibiotics Unknown (See Comments)       Objective     Physical Exam:  Vital Signs:   Vitals:    09/14/23 1303   BP: (!) 80/60   BP Location: Left arm   Patient Position: Sitting   Cuff Size: Adult   Pulse: 50   Weight: 50.6 kg (111 lb 9.6 oz)   Height: 162.6 cm (64\")   PainSc: 0-No pain     Body mass index is 19.16 kg/m².   BMI is within normal parameters. No other follow-up for BMI required.       Physical Exam  Constitutional:       General: She is not in acute distress.     Appearance: She " is not ill-appearing.   Neurological:      Mental Status: She is alert and oriented to person, place, and time.   Psychiatric:         Mood and Affect: Mood normal.       Procedures    Assessment / Plan      Assessment/Plan:   Diagnoses and all orders for this visit:    1. Chronic diastolic congestive heart failure (Primary)  -     bumetanide (BUMEX) 1 MG tablet; Take 1 tablet by mouth Daily.  Dispense: 90 tablet; Refill: 0    2. CKD (chronic kidney disease) stage 4, GFR 15-29 ml/min  -     Basic Metabolic Panel    3. High risk medication use  -     Basic Metabolic Panel  -     Magnesium    4. Anemia, unspecified type  -     Iron Profile  -     Ferritin  -     CBC Auto Differential  -     Vitamin B12  -     Folate    5. Chronic constipation  Assessment & Plan:  Patient on chronic opioid therapy.  She has been taking stool softener.  I recommend change to a stool softener plus stimulant laxative, docusate sodium plus sennosides two nightly.           Patient is doing better.  I think we got her medication list reconciled.  Son and caregiver given a written copy so they can compare to what they have at home.  The [facility] medication list did not have her famotidine, potassium chloride or her sildenafil.  If blood pressure starts running too low I instructed to decrease her amlodipine to 2.5 mg daily.  She is taking iron but I am not sure if she has iron deficiency anemia so we will go ahead and check an iron profile.  We do not want her to get iron overload.  Son and caregiver not sure if she is currently taking potassium, she was only taking that when she was taking furosemide which was only as needed.  She was told to take her bumetanide daily so she was given refill for this.  We will see what her potassium shows.  May need refills of her potassium depending on the potassium level.  Also go ahead and check a magnesium level.  Follow-up with Dr. Ross as previously planned.  TCM visit not done today because  patient was not seen within the 14 days of discharge.    Medications:     Current Outpatient Medications:     alendronate (FOSAMAX) 70 MG tablet, Take 1 tablet by mouth 1 (One) Time Per Week., Disp: 12 tablet, Rfl: 0    amLODIPine (NORVASC) 5 MG tablet, Take 1 tablet by mouth Daily., Disp: 90 tablet, Rfl: 1    apixaban (ELIQUIS) 5 MG tablet tablet, Take 1 tablet by mouth 2 (Two) Times a Day., Disp: , Rfl:     atorvastatin (LIPITOR) 20 MG tablet, Take 1 tablet by mouth Daily., Disp: 90 tablet, Rfl: 0    bumetanide (BUMEX) 1 MG tablet, Take 1 tablet by mouth Daily., Disp: 90 tablet, Rfl: 0    carvedilol (COREG) 6.25 MG tablet, Take 1 tablet by mouth 2 (Two) Times a Day With Meals., Disp: , Rfl:     escitalopram (LEXAPRO) 20 MG tablet, Take 1 tablet by mouth Daily., Disp: 90 tablet, Rfl: 1    ferrous sulfate 325 (65 FE) MG tablet, Take 1 tablet by mouth 3 (Three) Times a Day., Disp: 270 tablet, Rfl: 0    gabapentin (NEURONTIN) 400 MG capsule, Take 1 capsule by mouth 3 (Three) Times a Day., Disp: 90 capsule, Rfl: 2    meclizine (ANTIVERT) 25 MG tablet, Take 1 tablet by mouth 3 (Three) Times a Day As Needed for Dizziness., Disp: 90 tablet, Rfl: 11    pantoprazole (PROTONIX) 40 MG EC tablet, Take 1 tablet by mouth Daily., Disp: , Rfl:     spironolactone (ALDACTONE) 25 MG tablet, Take 1 tablet by mouth Daily., Disp: 90 tablet, Rfl: 1    famotidine (PEPCID) 40 MG tablet, Take 1 tablet by mouth every night at bedtime., Disp: 90 tablet, Rfl: 0    HYDROcodone-acetaminophen (NORCO)  MG per tablet, Take 1 tablet by mouth Every 8 (Eight) Hours As Needed for Moderate Pain., Disp: 90 tablet, Rfl: 0    potassium chloride (KLOR-CON) 20 MEQ CR tablet, Take 1 tablet by mouth Daily., Disp: 90 tablet, Rfl: 1    sildenafil (REVATIO) 20 MG tablet, Take 1 tablet by mouth 3 (Three) Times a Day., Disp: 90 tablet, Rfl: 5    I spent 70 minutes caring for Emily on this date of service. This time includes time spent by me in the following  activities:preparing for the visit, reviewing tests, obtaining and/or reviewing a separately obtained history, performing a medically appropriate examination and/or evaluation , counseling and educating the patient/family/caregiver, ordering medications, tests, or procedures, and documenting information in the medical record    Follow Up:   No follow-ups on file.    Antonette Stokes PA-C   [facility]

## 2023-09-14 NOTE — ASSESSMENT & PLAN NOTE
Patient on chronic opioid therapy.  She has been taking stool softener.  I recommend change to a stool softener plus stimulant laxative, docusate sodium plus sennosides two nightly.

## 2023-09-15 LAB
BASOPHILS # BLD AUTO: 0 X10E3/UL (ref 0–0.2)
BASOPHILS NFR BLD AUTO: 1 %
BUN SERPL-MCNC: 30 MG/DL (ref 8–27)
BUN/CREAT SERPL: 16 (ref 12–28)
CALCIUM SERPL-MCNC: 9.4 MG/DL (ref 8.7–10.3)
CHLORIDE SERPL-SCNC: 93 MMOL/L (ref 96–106)
CO2 SERPL-SCNC: 26 MMOL/L (ref 20–29)
CREAT SERPL-MCNC: 1.87 MG/DL (ref 0.57–1)
EGFRCR SERPLBLD CKD-EPI 2021: 26 ML/MIN/1.73
EOSINOPHIL # BLD AUTO: 0.6 X10E3/UL (ref 0–0.4)
EOSINOPHIL NFR BLD AUTO: 6 %
ERYTHROCYTE [DISTWIDTH] IN BLOOD BY AUTOMATED COUNT: 11.8 % (ref 11.7–15.4)
FERRITIN SERPL-MCNC: 99 NG/ML (ref 15–150)
FOLATE SERPL-MCNC: 10.9 NG/ML
GLUCOSE SERPL-MCNC: 112 MG/DL (ref 70–99)
HCT VFR BLD AUTO: 30.9 % (ref 34–46.6)
HGB BLD-MCNC: 10.3 G/DL (ref 11.1–15.9)
IMM GRANULOCYTES # BLD AUTO: 0 X10E3/UL (ref 0–0.1)
IMM GRANULOCYTES NFR BLD AUTO: 0 %
IRON SATN MFR SERPL: 32 % (ref 15–55)
IRON SERPL-MCNC: 96 UG/DL (ref 27–139)
LYMPHOCYTES # BLD AUTO: 1.1 X10E3/UL (ref 0.7–3.1)
LYMPHOCYTES NFR BLD AUTO: 13 %
MAGNESIUM SERPL-MCNC: 2.2 MG/DL (ref 1.6–2.3)
MCH RBC QN AUTO: 32.5 PG (ref 26.6–33)
MCHC RBC AUTO-ENTMCNC: 33.3 G/DL (ref 31.5–35.7)
MCV RBC AUTO: 98 FL (ref 79–97)
MONOCYTES # BLD AUTO: 0.9 X10E3/UL (ref 0.1–0.9)
MONOCYTES NFR BLD AUTO: 10 %
NEUTROPHILS # BLD AUTO: 6 X10E3/UL (ref 1.4–7)
NEUTROPHILS NFR BLD AUTO: 70 %
PLATELET # BLD AUTO: 138 X10E3/UL (ref 150–450)
POTASSIUM SERPL-SCNC: 4.1 MMOL/L (ref 3.5–5.2)
RBC # BLD AUTO: 3.17 X10E6/UL (ref 3.77–5.28)
SODIUM SERPL-SCNC: 136 MMOL/L (ref 134–144)
TIBC SERPL-MCNC: 297 UG/DL (ref 250–450)
UIBC SERPL-MCNC: 201 UG/DL (ref 118–369)
VIT B12 SERPL-MCNC: 922 PG/ML (ref 232–1245)
WBC # BLD AUTO: 8.5 X10E3/UL (ref 3.4–10.8)

## 2023-09-18 ENCOUNTER — TELEPHONE (OUTPATIENT)
Dept: FAMILY MEDICINE CLINIC | Facility: CLINIC | Age: 88
End: 2023-09-18
Payer: MEDICARE

## 2023-09-18 NOTE — TELEPHONE ENCOUNTER
Caller: PEÑA    Relationship: CAREGIVER    Best call back number: [phone]     What was the call regarding: PEÑA CALL TO DISCUSS A PRESCRIPTION THAT THE PATIENT IS NOT TAKING.

## 2023-09-18 NOTE — TELEPHONE ENCOUNTER
PEÑA SAYS RAGHAV IS NOT TAKING POTASSIUM.      [id number] - [place], [place] - [address] - [phone] [facility] [phone]  [phone]

## 2023-10-14 DIAGNOSIS — N32.81 OVERACTIVE BLADDER: ICD-10-CM

## 2023-10-16 RX ORDER — ALENDRONATE SODIUM 70 MG/1
70 TABLET ORAL WEEKLY
Qty: 4 TABLET | Refills: 0 | Status: SHIPPED | OUTPATIENT
Start: 2023-10-16

## 2023-10-16 NOTE — TELEPHONE ENCOUNTER
Rx Refill Note    Requested Prescriptions     Pending Prescriptions Disp Refills    alendronate (FOSAMAX) 70 MG tablet [Pharmacy Med Name: ALENDRONATE SODIUM 70 MG TAB] 4 tablet 0     Sig: TAKE 1 TABLET BY MOUTH 1 (ONE) TIME PER WEEK.        Last office visit with prescribing clinician: 7/20/2023      Next office visit with prescribing clinician: 10/30/2023   Last labs:   Last refill: 07/25/2023   Pharmacy (be sure to add in Epic). correct

## 2023-10-20 DIAGNOSIS — K21.9 GASTROESOPHAGEAL REFLUX DISEASE WITHOUT ESOPHAGITIS: ICD-10-CM

## 2023-10-20 RX ORDER — FAMOTIDINE 40 MG/1
40 TABLET, FILM COATED ORAL
Qty: 90 TABLET | Refills: 0 | Status: SHIPPED | OUTPATIENT
Start: 2023-10-20

## 2023-10-20 RX ORDER — FUROSEMIDE 20 MG/1
20 TABLET ORAL EVERY 12 HOURS
Qty: 180 TABLET | Refills: 0 | OUTPATIENT
Start: 2023-10-20

## 2023-10-20 NOTE — TELEPHONE ENCOUNTER
Rx Refill Note    Requested Prescriptions     Pending Prescriptions Disp Refills    famotidine (PEPCID) 40 MG tablet [Pharmacy Med Name: FAMOTIDINE 40 MG TABLET] 90 tablet 0     Sig: TAKE 1 TABLET BY MOUTH EVERYDAY AT BEDTIME    furosemide (LASIX) 20 MG tablet [Pharmacy Med Name: FUROSEMIDE 20 MG TABLET] 180 tablet 0     Sig: TAKE 1 TABLET BY MOUTH EVERY 12 HOURS        Last office visit with prescribing clinician: 7/20/2023      Next office visit with prescribing clinician: 10/30/2023   Last labs:   Last refill: needs   Pharmacy (be sure to add in Epic). correct

## 2023-10-28 ENCOUNTER — READMISSION MANAGEMENT (OUTPATIENT)
Dept: CALL CENTER | Facility: HOSPITAL | Age: 88
End: 2023-10-28
Payer: MEDICARE

## 2023-10-28 DIAGNOSIS — I50.32 CHRONIC DIASTOLIC CONGESTIVE HEART FAILURE: Chronic | ICD-10-CM

## 2023-10-28 DIAGNOSIS — N32.81 OVERACTIVE BLADDER: ICD-10-CM

## 2023-10-28 NOTE — OUTREACH NOTE
Prep Survey      Flowsheet Row Responses   [facility] facility patient discharged from? Non-BH   Is LACE score < 7 ? Non-BH Discharge   Eligibility [facility]   Date of Discharge 10/27/23   Discharge Disposition Home-Health Care Svc   Discharge diagnosis ANEMIA   Does the patient have one of the following disease processes/diagnoses(primary or secondary)? Other   Does the patient have Home health ordered? Yes   What is the Home health agency?  HOME HEALTH SERVICE   Prep survey completed? Yes            Nely TOSCANO - Registered Nurse

## 2023-10-30 ENCOUNTER — TRANSITIONAL CARE MANAGEMENT TELEPHONE ENCOUNTER (OUTPATIENT)
Dept: CALL CENTER | Facility: HOSPITAL | Age: 88
End: 2023-10-30
Payer: MEDICARE

## 2023-10-30 ENCOUNTER — TELEMEDICINE (OUTPATIENT)
Dept: FAMILY MEDICINE CLINIC | Facility: CLINIC | Age: 88
End: 2023-10-30
Payer: MEDICARE

## 2023-10-30 DIAGNOSIS — N18.4 CKD (CHRONIC KIDNEY DISEASE) STAGE 4, GFR 15-29 ML/MIN: ICD-10-CM

## 2023-10-30 DIAGNOSIS — G89.29 OTHER CHRONIC PAIN: ICD-10-CM

## 2023-10-30 DIAGNOSIS — G62.9 NEUROPATHY: ICD-10-CM

## 2023-10-30 DIAGNOSIS — D64.9 ANEMIA, UNSPECIFIED TYPE: Primary | ICD-10-CM

## 2023-10-30 DIAGNOSIS — R63.4 LOSS OF WEIGHT: ICD-10-CM

## 2023-10-30 DIAGNOSIS — R53.82 CHRONIC FATIGUE: ICD-10-CM

## 2023-10-30 RX ORDER — HYDROCODONE BITARTRATE AND ACETAMINOPHEN 10; 325 MG/1; MG/1
1 TABLET ORAL EVERY 8 HOURS PRN
Qty: 90 TABLET | Refills: 0 | Status: SHIPPED | OUTPATIENT
Start: 2023-10-30

## 2023-10-30 RX ORDER — ALENDRONATE SODIUM 70 MG/1
70 TABLET ORAL WEEKLY
Qty: 4 TABLET | Refills: 0 | Status: SHIPPED | OUTPATIENT
Start: 2023-10-30

## 2023-10-30 RX ORDER — AMLODIPINE BESYLATE 5 MG/1
5 TABLET ORAL DAILY
Qty: 90 TABLET | Refills: 1 | OUTPATIENT
Start: 2023-10-30

## 2023-10-30 RX ORDER — GABAPENTIN 400 MG/1
400 CAPSULE ORAL 3 TIMES DAILY
Qty: 90 CAPSULE | Refills: 2 | Status: SHIPPED | OUTPATIENT
Start: 2023-10-30

## 2023-10-30 RX ORDER — BUMETANIDE 1 MG/1
1 TABLET ORAL DAILY
Qty: 90 TABLET | Refills: 0 | Status: SHIPPED | OUTPATIENT
Start: 2023-10-30

## 2023-10-30 RX ORDER — SPIRONOLACTONE 25 MG/1
25 TABLET ORAL DAILY
Qty: 90 TABLET | Refills: 1 | OUTPATIENT
Start: 2023-10-30

## 2023-10-30 NOTE — PROGRESS NOTES
Telehealth E-Visit      Patient Name: Emily Green  : 1935   MRN: [id number]     Chief Complaint:    Chief Complaint   Patient presents with    Follow-up       I have reviewed the E-Visit questionnaire and the patient's answers, my assessment and plan are listed below.   You have chosen to receive care through a telehealth visit.  Do you consent to use a video/audio connection for your medical care today? Yes    History of Present Illness: Emily Green is a 88 y.o. female who is here today to follow up with hospital f/u and recheck      Subjective      Review of Systems:   Review of Systems   Constitutional: Negative.    HENT: Negative.     Eyes: Negative.    Respiratory: Negative.     Cardiovascular: Negative.    Gastrointestinal: Negative.    Neurological: Negative.        I have reviewed and the following portions of the patient's history were updated as appropriate: past family history, past medical history, past social history, past surgical history and problem list.    Medications:     Current Outpatient Medications:     gabapentin (NEURONTIN) 400 MG capsule, Take 1 capsule by mouth 3 (Three) Times a Day., Disp: 90 capsule, Rfl: 2    HYDROcodone-acetaminophen (NORCO)  MG per tablet, Take 1 tablet by mouth Every 8 (Eight) Hours As Needed for Moderate Pain., Disp: 90 tablet, Rfl: 0    alendronate (FOSAMAX) 70 MG tablet, TAKE 1 TABLET BY MOUTH 1 (ONE) TIME PER WEEK., Disp: 4 tablet, Rfl: 0    amLODIPine (NORVASC) 5 MG tablet, Take 1 tablet by mouth Daily., Disp: 90 tablet, Rfl: 1    apixaban (ELIQUIS) 5 MG tablet tablet, Take 1 tablet by mouth 2 (Two) Times a Day., Disp: , Rfl:     atorvastatin (LIPITOR) 20 MG tablet, Take 1 tablet by mouth Daily., Disp: 90 tablet, Rfl: 0    bumetanide (BUMEX) 1 MG tablet, TAKE 1 TABLET BY MOUTH EVERY DAY, Disp: 90 tablet, Rfl: 0    carvedilol (COREG) 6.25 MG tablet, Take 1 tablet by mouth 2 (Two) Times a Day With Meals., Disp: , Rfl:     escitalopram  (LEXAPRO) 20 MG tablet, Take 1 tablet by mouth Daily., Disp: 90 tablet, Rfl: 1    famotidine (PEPCID) 40 MG tablet, TAKE 1 TABLET BY MOUTH EVERYDAY AT BEDTIME, Disp: 90 tablet, Rfl: 0    ferrous sulfate 325 (65 FE) MG tablet, Take 1 tablet by mouth 3 (Three) Times a Day., Disp: 270 tablet, Rfl: 0    meclizine (ANTIVERT) 25 MG tablet, Take 1 tablet by mouth 3 (Three) Times a Day As Needed for Dizziness., Disp: 90 tablet, Rfl: 11    pantoprazole (PROTONIX) 40 MG EC tablet, Take 1 tablet by mouth Daily., Disp: , Rfl:     potassium chloride (KLOR-CON) 20 MEQ CR tablet, Take 1 tablet by mouth Daily., Disp: 90 tablet, Rfl: 1    sildenafil (REVATIO) 20 MG tablet, Take 1 tablet by mouth 3 (Three) Times a Day., Disp: 90 tablet, Rfl: 5    spironolactone (ALDACTONE) 25 MG tablet, Take 1 tablet by mouth Daily., Disp: 90 tablet, Rfl: 1    Allergies:   Allergies   Allergen Reactions    Codeine Nausea And Vomiting    Nitrofurantoin Nausea And Vomiting    Nitrofurantoin Macrocrystal Nausea And Vomiting    Sulfa Antibiotics Unknown (See Comments)       Objective     Physical Exam:  Vital Signs: There were no vitals filed for this visit.  There is no height or weight on file to calculate BMI.    Physical Exam  Vitals and nursing note reviewed.   Constitutional:       Appearance: Normal appearance. She is normal weight.   Pulmonary:      Effort: Pulmonary effort is normal.   Musculoskeletal:      Cervical back: Normal range of motion.   Feet:      Comments:      Neurological:      General: No focal deficit present.      Mental Status: She is alert and oriented to person, place, and time. Mental status is at baseline.   Psychiatric:         Mood and Affect: Mood normal.         Behavior: Behavior normal.         Thought Content: Thought content normal.         Judgment: Judgment normal.         Assessment / Plan      Assessment/Plan:   Diagnoses and all orders for this visit:    1. Anemia, unspecified type (Primary)  Assessment &  Plan:  Recheck Blood work in 1 month.      2. Neuropathy  Assessment & Plan:  Refill gabapentin.  We will have her sign at hospital and paperwork in 1 month.    Orders:  -     gabapentin (NEURONTIN) 400 MG capsule; Take 1 capsule by mouth 3 (Three) Times a Day.  Dispense: 90 capsule; Refill: 2    3. Other chronic pain  Assessment & Plan:  Refill hydrocodone.  Recheck 1 month.    Orders:  -     HYDROcodone-acetaminophen (NORCO)  MG per tablet; Take 1 tablet by mouth Every 8 (Eight) Hours As Needed for Moderate Pain.  Dispense: 90 tablet; Refill: 0    4. CKD (chronic kidney disease) stage 4, GFR 15-29 ml/min  Assessment & Plan:  Patient recently seen in the hospital.  We will check blood work in 1 month.Patient is instructed to not take any NSAIDs.  Medicines as directed.  Stay well-hydrated.        5. Loss of weight  Assessment & Plan:  Patient is a make appointment to be seen in 1 month.  We will weigh her then.      6. Chronic fatigue  Assessment & Plan:  Patient was seen in the hospital found of UTI.  She was also found to be anemic.  She is doing better now.           Follow Up:   Return in about 1 month (around 11/30/2023) for Annual physical.    Any medications prescribed have been sent electronically to   [id number] - [place], [place] - [address] - [phone]  - [phone]   [address] [place] [address]  Phone: [phone] Fax: [phone]      15 minutes were spent reviewing the patient's questionnaire, formulating a treatment plan, and relaying information to the patient via [employer].    Zeeshan Ross MD  [facility]   10/30/23  15:17 EDT

## 2023-10-30 NOTE — ASSESSMENT & PLAN NOTE
Patient recently seen in the hospital.  We will check blood work in 1 month.Patient is instructed to not take any NSAIDs.  Medicines as directed.  Stay well-hydrated.

## 2023-10-30 NOTE — ASSESSMENT & PLAN NOTE
Patient was seen in the hospital found of UTI.  She was also found to be anemic.  She is doing better now.

## 2023-10-30 NOTE — TELEPHONE ENCOUNTER
Rx Refill Note    Requested Prescriptions     Pending Prescriptions Disp Refills    alendronate (FOSAMAX) 70 MG tablet [Pharmacy Med Name: ALENDRONATE SODIUM 70 MG TAB] 4 tablet 0     Sig: TAKE 1 TABLET BY MOUTH 1 (ONE) TIME PER WEEK.        Last office visit with prescribing clinician: Visit date not found      Next office visit with prescribing clinician: Visit date not found   Last labs:   Last refill: 10/16/2023   Pharmacy (be sure to add in Epic). correct

## 2023-10-30 NOTE — OUTREACH NOTE
Call Center TCM Note      Flowsheet Row Responses   [facility] patient discharged from? Non-BH   Does the patient have one of the following disease processes/diagnoses(primary or secondary)? Other   TCM attempt successful? Yes   Call start time 1526   Call end time 1526   Discharge diagnosis ANEMIA   TCM call completed? Yes   Wrap up additional comments Has a completed hospital f/u appt with PCP documented.   Call end time 1526   Would this patient benefit from a Referral to [facility] Social Work? No   Is the patient interested in additional calls from an ambulatory ? No            Teresa Masterson RN    10/30/2023, 15:27 EDT

## 2023-11-13 DIAGNOSIS — I10 HYPERTENSION, UNSPECIFIED TYPE: Primary | Chronic | ICD-10-CM

## 2023-11-13 NOTE — TELEPHONE ENCOUNTER
Incoming Refill Request      Medication requested (name and dose):   AMLODIPINE 5 MG  CARVEDILOL 6.25 MG    Pharmacy where request should be sent:   [facility]    Additional details provided by patient:   WILL BE OUT SOON    Best call back number: [phone]    Does the patient have less than a 3 day supply:  [x] Yes  [] No    Genaro Monson Rep  11/13/23, 16:29 EST

## 2023-11-14 RX ORDER — AMLODIPINE BESYLATE 5 MG/1
5 TABLET ORAL DAILY
Qty: 90 TABLET | Refills: 1 | Status: SHIPPED | OUTPATIENT
Start: 2023-11-14

## 2023-11-14 RX ORDER — CARVEDILOL 6.25 MG/1
6.25 TABLET ORAL 2 TIMES DAILY WITH MEALS
Qty: 180 TABLET | Refills: 1 | Status: SHIPPED | OUTPATIENT
Start: 2023-11-14

## 2024-01-22 DIAGNOSIS — I50.31 ACUTE DIASTOLIC CONGESTIVE HEART FAILURE: Primary | ICD-10-CM

## 2024-01-22 RX ORDER — POTASSIUM CHLORIDE 20 MEQ/1
20 TABLET, EXTENDED RELEASE ORAL DAILY
Qty: 90 TABLET | Refills: 1 | Status: SHIPPED | OUTPATIENT
Start: 2024-01-22 | End: 2024-01-24 | Stop reason: SDUPTHER

## 2024-01-22 RX ORDER — CARVEDILOL 3.12 MG/1
3.12 TABLET ORAL 2 TIMES DAILY WITH MEALS
Qty: 180 TABLET | Refills: 1 | OUTPATIENT
Start: 2024-01-22

## 2024-01-22 RX ORDER — POTASSIUM CHLORIDE 1500 MG/1
20 TABLET, EXTENDED RELEASE ORAL DAILY
Qty: 90 TABLET | Refills: 1 | OUTPATIENT
Start: 2024-01-22

## 2024-01-23 DIAGNOSIS — K21.9 GASTROESOPHAGEAL REFLUX DISEASE WITHOUT ESOPHAGITIS: ICD-10-CM

## 2024-01-23 DIAGNOSIS — E78.2 MODERATE MIXED HYPERLIPIDEMIA NOT REQUIRING STATIN THERAPY: Chronic | ICD-10-CM

## 2024-01-23 DIAGNOSIS — I10 HYPERTENSION, UNSPECIFIED TYPE: Primary | Chronic | ICD-10-CM

## 2024-01-23 RX ORDER — FAMOTIDINE 40 MG/1
40 TABLET, FILM COATED ORAL
Qty: 90 TABLET | Refills: 0 | Status: SHIPPED | OUTPATIENT
Start: 2024-01-23

## 2024-01-24 ENCOUNTER — TELEMEDICINE (OUTPATIENT)
Dept: FAMILY MEDICINE CLINIC | Facility: CLINIC | Age: 89
End: 2024-01-24
Payer: MEDICARE

## 2024-01-24 DIAGNOSIS — E78.2 MODERATE MIXED HYPERLIPIDEMIA NOT REQUIRING STATIN THERAPY: Chronic | ICD-10-CM

## 2024-01-24 DIAGNOSIS — G89.29 OTHER CHRONIC PAIN: ICD-10-CM

## 2024-01-24 DIAGNOSIS — I10 HYPERTENSION, UNSPECIFIED TYPE: Chronic | ICD-10-CM

## 2024-01-24 DIAGNOSIS — D64.9 ANEMIA, UNSPECIFIED TYPE: ICD-10-CM

## 2024-01-24 DIAGNOSIS — N18.4 CKD (CHRONIC KIDNEY DISEASE) STAGE 4, GFR 15-29 ML/MIN: ICD-10-CM

## 2024-01-24 DIAGNOSIS — E87.5 HYPERKALEMIA: Primary | ICD-10-CM

## 2024-01-24 DIAGNOSIS — I50.31 ACUTE DIASTOLIC CONGESTIVE HEART FAILURE: ICD-10-CM

## 2024-01-24 RX ORDER — SPIRONOLACTONE 25 MG/1
25 TABLET ORAL DAILY
Qty: 90 TABLET | Refills: 1 | Status: SHIPPED | OUTPATIENT
Start: 2024-01-24

## 2024-01-24 RX ORDER — MECLIZINE HYDROCHLORIDE 25 MG/1
25 TABLET ORAL 3 TIMES DAILY PRN
Qty: 90 TABLET | Refills: 11 | Status: SHIPPED | OUTPATIENT
Start: 2024-01-24

## 2024-01-24 RX ORDER — POTASSIUM CHLORIDE 20 MEQ/1
20 TABLET, EXTENDED RELEASE ORAL DAILY
Qty: 90 TABLET | Refills: 1 | Status: SHIPPED | OUTPATIENT
Start: 2024-01-24

## 2024-01-24 RX ORDER — HYDROCODONE BITARTRATE AND ACETAMINOPHEN 10; 325 MG/1; MG/1
1 TABLET ORAL EVERY 8 HOURS PRN
Qty: 90 TABLET | Refills: 0 | Status: SHIPPED | OUTPATIENT
Start: 2024-01-24

## 2024-01-24 RX ORDER — AMLODIPINE BESYLATE 5 MG/1
5 TABLET ORAL DAILY
Qty: 90 TABLET | Refills: 1 | Status: SHIPPED | OUTPATIENT
Start: 2024-01-24

## 2024-01-24 RX ORDER — SPIRONOLACTONE 25 MG/1
25 TABLET ORAL DAILY
Qty: 90 TABLET | Refills: 1 | Status: SHIPPED | OUTPATIENT
Start: 2024-01-24 | End: 2024-01-24 | Stop reason: SDUPTHER

## 2024-01-24 RX ORDER — ATORVASTATIN CALCIUM 20 MG/1
20 TABLET, FILM COATED ORAL DAILY
Qty: 90 TABLET | Refills: 1 | Status: SHIPPED | OUTPATIENT
Start: 2024-01-24 | End: 2024-01-24 | Stop reason: SDUPTHER

## 2024-01-24 RX ORDER — ATORVASTATIN CALCIUM 20 MG/1
20 TABLET, FILM COATED ORAL DAILY
Qty: 90 TABLET | Refills: 1 | Status: SHIPPED | OUTPATIENT
Start: 2024-01-24

## 2024-01-24 NOTE — ASSESSMENT & PLAN NOTE
This is a dilemma.  She has a history of heart failure.  She is on spironolactone.  She is also on Lasix.  We are going to check her blood work next week should be seeing nephrology.  I rechecked a week after.  Make sure that she is therapeutic.

## 2024-01-24 NOTE — PROGRESS NOTES
Telehealth E-Visit      Patient Name: Emily Green  : 1935   MRN: [id number]     Chief Complaint:    Chief Complaint   Patient presents with    Follow-up    Constipation       I have reviewed the E-Visit questionnaire and the patient's answers, my assessment and plan are listed below.   You have chosen to receive care through a telehealth visit.  Do you consent to use a video/audio connection for your medical care today? Yes     History of Present Illness: Emily Green is a 88 y.o. female who is here today to follow up with medication problem      Subjective      Review of Systems:   Review of Systems   Constitutional:  Positive for fatigue.   HENT: Negative.     Eyes: Negative.    Respiratory: Negative.     Cardiovascular: Negative.    Gastrointestinal:  Positive for constipation.       I have reviewed and the following portions of the patient's history were updated as appropriate: past family history, past medical history, past social history, past surgical history and problem list.    Medications:     Current Outpatient Medications:     amLODIPine (NORVASC) 5 MG tablet, Take 1 tablet by mouth Daily., Disp: 90 tablet, Rfl: 1    atorvastatin (LIPITOR) 20 MG tablet, Take 1 tablet by mouth Daily., Disp: 90 tablet, Rfl: 1    HYDROcodone-acetaminophen (NORCO)  MG per tablet, Take 1 tablet by mouth Every 8 (Eight) Hours As Needed for Moderate Pain., Disp: 90 tablet, Rfl: 0    meclizine (ANTIVERT) 25 MG tablet, Take 1 tablet by mouth 3 (Three) Times a Day As Needed for Dizziness., Disp: 90 tablet, Rfl: 11    potassium chloride (KLOR-CON) 20 MEQ CR tablet, Take 1 tablet by mouth Daily., Disp: 90 tablet, Rfl: 1    spironolactone (ALDACTONE) 25 MG tablet, Take 1 tablet by mouth Daily., Disp: 90 tablet, Rfl: 1    alendronate (FOSAMAX) 70 MG tablet, TAKE 1 TABLET BY MOUTH 1 (ONE) TIME PER WEEK., Disp: 4 tablet, Rfl: 0    apixaban (ELIQUIS) 5 MG tablet tablet, Take 1 tablet by mouth 2 (Two) Times a Day.,  Disp: , Rfl:     bumetanide (BUMEX) 1 MG tablet, TAKE 1 TABLET BY MOUTH EVERY DAY, Disp: 90 tablet, Rfl: 0    carvedilol (COREG) 6.25 MG tablet, Take 1 tablet by mouth 2 (Two) Times a Day With Meals., Disp: 180 tablet, Rfl: 1    escitalopram (LEXAPRO) 20 MG tablet, Take 1 tablet by mouth Daily., Disp: 90 tablet, Rfl: 1    famotidine (PEPCID) 40 MG tablet, TAKE 1 TABLET BY MOUTH EVERYDAY AT BEDTIME, Disp: 90 tablet, Rfl: 0    ferrous sulfate 325 (65 FE) MG tablet, Take 1 tablet by mouth 3 (Three) Times a Day., Disp: 270 tablet, Rfl: 0    gabapentin (NEURONTIN) 400 MG capsule, Take 1 capsule by mouth 3 (Three) Times a Day., Disp: 90 capsule, Rfl: 2    pantoprazole (PROTONIX) 40 MG EC tablet, Take 1 tablet by mouth Daily., Disp: , Rfl:     sildenafil (REVATIO) 20 MG tablet, Take 1 tablet by mouth 3 (Three) Times a Day., Disp: 90 tablet, Rfl: 5    Allergies:   Allergies   Allergen Reactions    Codeine Nausea And Vomiting    Nitrofurantoin Nausea And Vomiting    Nitrofurantoin Macrocrystal Nausea And Vomiting    Sulfa Antibiotics Unknown (See Comments)       Objective     Physical Exam:  Vital Signs: There were no vitals filed for this visit.  There is no height or weight on file to calculate BMI.    Physical Exam  Neurological:      General: No focal deficit present.   Psychiatric:         Mood and Affect: Mood normal.         Behavior: Behavior normal.         Thought Content: Thought content normal.         Judgment: Judgment normal.         Assessment / Plan      Assessment/Plan:   Diagnoses and all orders for this visit:    1. Hyperkalemia (Primary)  Assessment & Plan:  This is a dilemma.  She has a history of heart failure.  She is on spironolactone.  She is also on Lasix.  We are going to check her blood work next week should be seeing nephrology.  I rechecked a week after.  Make sure that she is therapeutic.      2. Anemia, unspecified type  Assessment & Plan:  Recheck Blood work      3. CKD (chronic kidney  disease) stage 4, GFR 15-29 ml/min  Assessment & Plan:  Patient sees nephrology next week.  They can Blood work a week before.      4. Hypertension, unspecified type  Assessment & Plan:  Discussed with patient to monitor their blood pressure and if systolic blood pressure goes above 140 or diastolic is above 90 to return to clinic.  Take medicines as directed, call for any problems, patient not having or any worrisome symptoms.        Orders:  -     amLODIPine (NORVASC) 5 MG tablet; Take 1 tablet by mouth Daily.  Dispense: 90 tablet; Refill: 1  -     spironolactone (ALDACTONE) 25 MG tablet; Take 1 tablet by mouth Daily.  Dispense: 90 tablet; Refill: 1    5. Moderate mixed hyperlipidemia not requiring statin therapy  Assessment & Plan:  She is going to get fasting blood work with the nephrologist.  I will get a copy.  Recheck in 1 month.    Orders:  -     atorvastatin (LIPITOR) 20 MG tablet; Take 1 tablet by mouth Daily.  Dispense: 90 tablet; Refill: 1    6. Acute diastolic congestive heart failure  -     potassium chloride (KLOR-CON) 20 MEQ CR tablet; Take 1 tablet by mouth Daily.  Dispense: 90 tablet; Refill: 1    7. Other chronic pain  Assessment & Plan:  Refill hydrocodone.  Paperwork at next visit    Orders:  -     HYDROcodone-acetaminophen (NORCO)  MG per tablet; Take 1 tablet by mouth Every 8 (Eight) Hours As Needed for Moderate Pain.  Dispense: 90 tablet; Refill: 0    Other orders  -     meclizine (ANTIVERT) 25 MG tablet; Take 1 tablet by mouth 3 (Three) Times a Day As Needed for Dizziness.  Dispense: 90 tablet; Refill: 11         Follow Up:   No follow-ups on file.    Any medications prescribed have been sent electronically to   [facility]/pharmacy #[id number] - [place], [place] - [address] - [phone]  - [phone]   [address]  [place] [address]  Phone: [phone] Fax: [phone]        Zeeshan Ross MD  [facility]   01/24/24  15:23 EST

## 2024-01-24 NOTE — ASSESSMENT & PLAN NOTE
She is going to get fasting blood work with the nephrologist.  I will get a copy.  Recheck in 1 month.

## 2024-02-13 DIAGNOSIS — N32.81 OVERACTIVE BLADDER: ICD-10-CM

## 2024-02-13 RX ORDER — ALENDRONATE SODIUM 70 MG/1
70 TABLET ORAL WEEKLY
Qty: 4 TABLET | Refills: 0 | Status: SHIPPED | OUTPATIENT
Start: 2024-02-13

## 2024-02-19 DIAGNOSIS — I50.32 CHRONIC DIASTOLIC CONGESTIVE HEART FAILURE: Chronic | ICD-10-CM

## 2024-02-20 RX ORDER — BUMETANIDE 1 MG/1
1 TABLET ORAL DAILY
Qty: 90 TABLET | Refills: 0 | Status: SHIPPED | OUTPATIENT
Start: 2024-02-20

## 2024-02-26 RX ORDER — APIXABAN 5 MG/1
5 TABLET, FILM COATED ORAL 2 TIMES DAILY
Qty: 60 TABLET | Refills: 3 | Status: SHIPPED | OUTPATIENT
Start: 2024-02-26

## 2024-03-26 ENCOUNTER — TELEPHONE (OUTPATIENT)
Dept: FAMILY MEDICINE CLINIC | Facility: CLINIC | Age: 89
End: 2024-03-26
Payer: MEDICARE

## 2024-03-28 NOTE — TELEPHONE ENCOUNTER
Name: PEÑA      Relationship: Other      Best Callback Number: [phone]      HUB PROVIDED THE RELAY MESSAGE FROM THE OFFICE      PATIENT: SCHEDULED PER NOTE

## 2024-04-04 DIAGNOSIS — N32.81 OVERACTIVE BLADDER: ICD-10-CM

## 2024-04-04 RX ORDER — ALENDRONATE SODIUM 70 MG/1
70 TABLET ORAL WEEKLY
Qty: 4 TABLET | Refills: 0 | Status: SHIPPED | OUTPATIENT
Start: 2024-04-04

## 2024-04-04 NOTE — TELEPHONE ENCOUNTER
Rx Refill Note    Requested Prescriptions     Pending Prescriptions Disp Refills    alendronate (FOSAMAX) 70 MG tablet [Pharmacy Med Name: ALENDRONATE SODIUM 70 MG TAB] 4 tablet 0     Sig: TAKE 1 TABLET BY MOUTH 1 (ONE) TIME PER WEEK.        Last office visit with prescribing clinician: 7/20/2023      Next office visit with prescribing clinician: 5/3/2024   Last labs:   Last refill: 02/13/2024   Pharmacy (be sure to add in Epic). correct

## 2024-04-08 RX ORDER — CARVEDILOL 3.12 MG/1
3.12 TABLET ORAL 2 TIMES DAILY WITH MEALS
Qty: 180 TABLET | Refills: 1 | OUTPATIENT
Start: 2024-04-08

## 2024-04-12 DIAGNOSIS — F41.9 ANXIETY: ICD-10-CM

## 2024-04-12 RX ORDER — ESCITALOPRAM OXALATE 20 MG/1
20 TABLET ORAL DAILY
Qty: 90 TABLET | Refills: 0 | Status: SHIPPED | OUTPATIENT
Start: 2024-04-12

## 2024-04-12 NOTE — TELEPHONE ENCOUNTER
Rx Refill Note    Requested Prescriptions     Pending Prescriptions Disp Refills    escitalopram (LEXAPRO) 20 MG tablet [Pharmacy Med Name: ESCITALOPRAM 20 MG TABLET] 90 tablet 0     Sig: TAKE 1 TABLET BY MOUTH EVERY DAY        Last office visit with prescribing clinician: 7/20/2023      Next office visit with prescribing clinician: 5/3/2024   Last labs:   Last refill: 07/25/2023   Pharmacy (be sure to add in Epic). correct

## 2024-04-24 ENCOUNTER — TELEPHONE (OUTPATIENT)
Dept: FAMILY MEDICINE CLINIC | Facility: CLINIC | Age: 89
End: 2024-04-24
Payer: MEDICARE

## 2024-04-24 NOTE — TELEPHONE ENCOUNTER
HUB TO RELAY     PLEASE RESCHEDULE PATIENTS MEDICARE WELLNESS VISIT. DR. MADDOX WILL BE OUT OF THE OFFICE THE DAY SHE'S SCHEDULED.    I LEFT VOICEMAIL MAKING PATIENT AWARE

## 2024-04-26 DIAGNOSIS — K21.9 GASTROESOPHAGEAL REFLUX DISEASE WITHOUT ESOPHAGITIS: ICD-10-CM

## 2024-04-26 RX ORDER — FAMOTIDINE 40 MG/1
40 TABLET, FILM COATED ORAL
Qty: 30 TABLET | Refills: 0 | Status: SHIPPED | OUTPATIENT
Start: 2024-04-26

## 2024-05-06 DIAGNOSIS — G89.29 OTHER CHRONIC PAIN: ICD-10-CM

## 2024-05-06 DIAGNOSIS — N32.81 OVERACTIVE BLADDER: ICD-10-CM

## 2024-05-06 DIAGNOSIS — I10 HYPERTENSION, UNSPECIFIED TYPE: Chronic | ICD-10-CM

## 2024-05-06 DIAGNOSIS — K21.9 GASTROESOPHAGEAL REFLUX DISEASE WITHOUT ESOPHAGITIS: ICD-10-CM

## 2024-05-06 DIAGNOSIS — D64.9 ANEMIA, UNSPECIFIED TYPE: ICD-10-CM

## 2024-05-06 RX ORDER — AMLODIPINE BESYLATE 5 MG/1
5 TABLET ORAL DAILY
Qty: 90 TABLET | Refills: 1 | Status: SHIPPED | OUTPATIENT
Start: 2024-05-06

## 2024-05-06 RX ORDER — FERROUS SULFATE 325(65) MG
1 TABLET ORAL 3 TIMES DAILY
Qty: 270 TABLET | Refills: 0 | Status: SHIPPED | OUTPATIENT
Start: 2024-05-06

## 2024-05-06 RX ORDER — ALENDRONATE SODIUM 70 MG/1
70 TABLET ORAL WEEKLY
Qty: 4 TABLET | Refills: 0 | Status: SHIPPED | OUTPATIENT
Start: 2024-05-06

## 2024-05-06 RX ORDER — FAMOTIDINE 40 MG/1
40 TABLET, FILM COATED ORAL
Qty: 30 TABLET | Refills: 0 | Status: SHIPPED | OUTPATIENT
Start: 2024-05-06

## 2024-05-06 RX ORDER — HYDROCODONE BITARTRATE AND ACETAMINOPHEN 10; 325 MG/1; MG/1
1 TABLET ORAL EVERY 8 HOURS PRN
Qty: 90 TABLET | Refills: 0 | Status: SHIPPED | OUTPATIENT
Start: 2024-05-06

## 2024-05-06 RX ORDER — SPIRONOLACTONE 25 MG/1
25 TABLET ORAL DAILY
Qty: 90 TABLET | Refills: 1 | Status: SHIPPED | OUTPATIENT
Start: 2024-05-06

## 2024-06-04 DIAGNOSIS — G62.9 NEUROPATHY: ICD-10-CM

## 2024-06-04 DIAGNOSIS — I50.32 CHRONIC DIASTOLIC CONGESTIVE HEART FAILURE: Chronic | ICD-10-CM

## 2024-06-04 DIAGNOSIS — N32.81 OVERACTIVE BLADDER: ICD-10-CM

## 2024-06-04 RX ORDER — GABAPENTIN 400 MG/1
400 CAPSULE ORAL 3 TIMES DAILY
Qty: 90 CAPSULE | Refills: 0 | Status: SHIPPED | OUTPATIENT
Start: 2024-06-04

## 2024-06-04 RX ORDER — BUMETANIDE 1 MG/1
1 TABLET ORAL DAILY
Qty: 90 TABLET | Refills: 0 | Status: SHIPPED | OUTPATIENT
Start: 2024-06-04

## 2024-06-04 RX ORDER — ALENDRONATE SODIUM 70 MG/1
70 TABLET ORAL WEEKLY
Qty: 4 TABLET | Refills: 0 | Status: SHIPPED | OUTPATIENT
Start: 2024-06-04

## 2024-06-04 NOTE — TELEPHONE ENCOUNTER
Rx Refill Note    Requested Prescriptions     Pending Prescriptions Disp Refills    gabapentin (NEURONTIN) 400 MG capsule [Pharmacy Med Name: GABAPENTIN 400 MG CAPSULE] 90 capsule 0     Sig: TAKE 1 CAPSULE BY MOUTH THREE TIMES A DAY    bumetanide (BUMEX) 1 MG tablet [Pharmacy Med Name: BUMETANIDE 1 MG TABLET] 90 tablet 0     Sig: TAKE 1 TABLET BY MOUTH EVERY DAY    alendronate (FOSAMAX) 70 MG tablet [Pharmacy Med Name: ALENDRONATE SODIUM 70 MG TAB] 4 tablet 0     Sig: TAKE 1 TABLET BY MOUTH 1 (ONE) TIME PER WEEK.        Last office visit with prescribing clinician: 7/20/2023      Next office visit with prescribing clinician: 7/5/2024   Last labs:   Last refill: needs   Pharmacy (be sure to add in Epic). correct

## 2024-07-04 DIAGNOSIS — Z86.711 HISTORY OF PULMONARY EMBOLISM: ICD-10-CM

## 2024-07-04 DIAGNOSIS — I48.11 LONGSTANDING PERSISTENT ATRIAL FIBRILLATION: Primary | Chronic | ICD-10-CM

## 2024-07-05 ENCOUNTER — OFFICE VISIT (OUTPATIENT)
Dept: FAMILY MEDICINE CLINIC | Facility: CLINIC | Age: 89
End: 2024-07-05
Payer: MEDICARE

## 2024-07-05 VITALS
DIASTOLIC BLOOD PRESSURE: 68 MMHG | BODY MASS INDEX: 16.68 KG/M2 | SYSTOLIC BLOOD PRESSURE: 102 MMHG | HEART RATE: 58 BPM | WEIGHT: 97.2 LBS

## 2024-07-05 DIAGNOSIS — F03.A0 MILD DEMENTIA WITHOUT BEHAVIORAL DISTURBANCE, PSYCHOTIC DISTURBANCE, MOOD DISTURBANCE, OR ANXIETY, UNSPECIFIED DEMENTIA TYPE: ICD-10-CM

## 2024-07-05 DIAGNOSIS — Z79.01 LONG TERM (CURRENT) USE OF ANTICOAGULANTS: ICD-10-CM

## 2024-07-05 DIAGNOSIS — N18.4 CKD (CHRONIC KIDNEY DISEASE) STAGE 4, GFR 15-29 ML/MIN: ICD-10-CM

## 2024-07-05 DIAGNOSIS — I48.11 LONGSTANDING PERSISTENT ATRIAL FIBRILLATION: ICD-10-CM

## 2024-07-05 DIAGNOSIS — Z00.00 PHYSICAL EXAM: ICD-10-CM

## 2024-07-05 DIAGNOSIS — I10 HYPERTENSION, UNSPECIFIED TYPE: ICD-10-CM

## 2024-07-05 DIAGNOSIS — I50.32 CHRONIC DIASTOLIC CONGESTIVE HEART FAILURE: ICD-10-CM

## 2024-07-05 DIAGNOSIS — Z86.711 HISTORY OF PULMONARY EMBOLISM: ICD-10-CM

## 2024-07-05 DIAGNOSIS — Z00.00 MEDICARE ANNUAL WELLNESS VISIT, SUBSEQUENT: Primary | ICD-10-CM

## 2024-07-05 RX ORDER — APIXABAN 5 MG/1
5 TABLET, FILM COATED ORAL 2 TIMES DAILY
Qty: 60 TABLET | Refills: 3 | Status: SHIPPED | OUTPATIENT
Start: 2024-07-05 | End: 2024-07-05 | Stop reason: SDUPTHER

## 2024-07-05 NOTE — PROGRESS NOTES
The ABCs of the Annual Wellness Visit  Subsequent Medicare Wellness Visit    Cristal Hoffer is a 89 y.o. female who presents for a Subsequent Medicare Wellness Visit.    The following portions of the patient's history were reviewed and   updated as appropriate: allergies, current medications, past family history, past medical history, past social history, past surgical history, and problem list.    Compared to one year ago, the patient feels her physical   health is worse.    Compared to one year ago, the patient feels her mental   health is worse.    Recent Hospitalizations:  She was admitted within the past 365 days at [facility].       Current Medical Providers:  Patient Care Team:  Zeeshan Ross MD as PCP - General (Family Medicine)    Outpatient Medications Prior to Visit   Medication Sig Dispense Refill    alendronate (FOSAMAX) 70 MG tablet TAKE 1 TABLET BY MOUTH 1 (ONE) TIME PER WEEK. 4 tablet 0    amLODIPine (NORVASC) 5 MG tablet Take 1 tablet by mouth Daily. 90 tablet 1    atorvastatin (LIPITOR) 20 MG tablet Take 1 tablet by mouth Daily. 90 tablet 1    bumetanide (BUMEX) 1 MG tablet TAKE 1 TABLET BY MOUTH EVERY DAY 90 tablet 0    carvedilol (COREG) 6.25 MG tablet Take 1 tablet by mouth 2 (Two) Times a Day With Meals. 180 tablet 1    escitalopram (LEXAPRO) 20 MG tablet TAKE 1 TABLET BY MOUTH EVERY DAY 90 tablet 0    famotidine (PEPCID) 40 MG tablet Take 1 tablet by mouth every night at bedtime. 30 tablet 0    ferrous sulfate 325 (65 FE) MG tablet Take 1 tablet by mouth 3 (Three) Times a Day. 270 tablet 0    gabapentin (NEURONTIN) 400 MG capsule TAKE 1 CAPSULE BY MOUTH THREE TIMES A DAY 90 capsule 0    HYDROcodone-acetaminophen (NORCO)  MG per tablet Take 1 tablet by mouth Every 8 (Eight) Hours As Needed for Moderate Pain. 90 tablet 0    meclizine (ANTIVERT) 25 MG tablet Take 1 tablet by mouth 3 (Three) Times a Day As Needed for Dizziness. 90 tablet 11    potassium chloride  (KLOR-CON) 20 MEQ CR tablet Take 1 tablet by mouth Daily. 90 tablet 1    sildenafil (REVATIO) 20 MG tablet Take 1 tablet by mouth 3 (Three) Times a Day. 90 tablet 5    spironolactone (ALDACTONE) 25 MG tablet Take 1 tablet by mouth Daily. 90 tablet 1    Eliquis 5 MG tablet tablet TAKE 1 TABLET BY MOUTH TWICE A DAY 60 tablet 3     No facility-administered medications prior to visit.       Opioid medication/s are on active medication list.  and I have evaluated her active treatment plan and pain score trends (see table).  Vitals:    07/05/24 1102   PainSc: 0-No pain     I have reviewed the chart for potential of high risk medication and harmful drug interactions in the elderly.          Aspirin is not on active medication list.  Aspirin use is contraindicated for this patient due to: current use of Eliquis.  .    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    Other chronic pain    Chronic obstructive lung disease    Dementia    Vertigo    Fatigue    Gastroesophageal reflux disease without esophagitis    Hereditary sensory neuropathy    High risk medication use    History of cerebrovascular accident    History of pulmonary embolism    Idiopathic osteoarthritis    Long term (current) use of anticoagulants    Neuropathy    Osteoporosis    Pulmonary hypertension    Recurrent major depression in partial remission    Seasonal allergies    Laceration of left elbow    Loss of weight    CKD (chronic kidney disease) stage 4, GFR 15-29 ml/min    Chronic constipation    Heart valve replaced    Hyperkalemia    Physical exam     Advance Care Planning   Advance Care Planning     Advance Directive is on file.  ACP discussion was held with  "the patient during this visit. Patient has an advance directive in EMR which is still valid.      Objective    Vitals:    24 1102   BP: 102/68   BP Location: Left arm   Patient Position: Sitting   Cuff Size: Adult   Pulse: 58   Weight: 44.1 kg (97 lb 3.2 oz)   PainSc: 0-No pain     Estimated body mass index is 16.68 kg/m² as calculated from the following:    Height as of 23: 162.6 cm (64\").    Weight as of this encounter: 44.1 kg (97 lb 3.2 oz).    BMI is within normal parameters. No other follow-up for BMI required.      Does the patient have evidence of cognitive impairment? No          HEALTH RISK ASSESSMENT    Smoking Status:  Social History     Tobacco Use   Smoking Status Never   Smokeless Tobacco Never     Alcohol Consumption:  Social History     Substance and Sexual Activity   Alcohol Use Never     Fall Risk Screen:    RALPHADI Fall Risk Assessment was completed, and patient is at MODERATE risk for falls. Assessment completed on:2024    Depression Screenin/5/2024    11:07 AM   PHQ-2/PHQ-9 Depression Screening   Little Interest or Pleasure in Doing Things 0-->not at all   Feeling Down, Depressed or Hopeless 0-->not at all   PHQ-9: Brief Depression Severity Measure Score 0       Health Habits and Functional and Cognitive Screenin/5/2024    11:08 AM   Functional & Cognitive Status   Do you have difficulty preparing food and eating? Yes   Do you have difficulty bathing yourself, getting dressed or grooming yourself? Yes   Do you have difficulty using the toilet? Yes   Do you have difficulty moving around from place to place? Yes   Do you have trouble with steps or getting out of a bed or a chair? Yes   Current Diet Well Balanced Diet   Dental Exam Not up to date   Eye Exam Up to date   Current Exercises Include No Regular Exercise   Do you need help using the phone?  Yes   Are you deaf or do you have serious difficulty hearing?  Yes   Do you need help to go to places out of walking " distance? Yes   Do you need help shopping? Yes   Do you need help preparing meals?  Yes   Do you need help with housework?  Yes   Do you need help with laundry? Yes   Do you need help taking your medications? Yes   Do you need help managing money? Yes   Do you ever drive or ride in a car without wearing a seat belt? No   Have you felt unusual stress, anger or loneliness in the last month? No   Who do you live with? Child   If you need help, do you have trouble finding someone available to you? No   Do you have difficulty concentrating, remembering or making decisions? Yes       Age-appropriate Screening Schedule:  Refer to the list below for future screening recommendations based on patient's age, sex and/or medical conditions. Orders for these recommended tests are listed in the plan section. The patient has been provided with a written plan.    Health Maintenance   Topic Date Due    BMI FOLLOWUP  Never done    RSV Vaccine - Adults (1 - 1-dose 60+ series) Never done    ZOSTER VACCINE (2 of 3) 04/01/2013    DXA SCAN  08/03/2018    COVID-19 Vaccine (3 - 2023-24 season) 09/01/2023    LIPID PANEL  09/15/2023    INFLUENZA VACCINE  08/01/2024    ANNUAL WELLNESS VISIT  07/05/2025    TDAP/TD VACCINES (2 - Tdap) 07/26/2027    Pneumococcal Vaccine 65+  Completed                  CMS Preventative Services Quick Reference  Risk Factors Identified During Encounter  Fall Risk-High or Moderate: Discussed Fall Prevention in the home  The above risks/problems have been discussed with the patient.  Pertinent information has been shared with the patient in the After Visit Summary.  An After Visit Summary and PPPS were made available to the patient.    Follow Up:   Next Medicare Wellness visit to be scheduled in 1 year.       Additional E&M Note during same encounter follows:  Patient has multiple medical problems which are significant and separately identifiable that require additional work above and beyond the Medicare Wellness  Visit.      Chief Complaint  Medicare Wellness-subsequent and Follow-up    Subjective        HPI  Emily DE LA ROSA Peter is also being seen today for Wellness and BP         Objective   Vital Signs:  /68 (BP Location: Left arm, Patient Position: Sitting, Cuff Size: Adult)   Pulse 58   Wt 44.1 kg (97 lb 3.2 oz)   BMI 16.68 kg/m²     Physical Exam                    Assessment and Plan   Diagnoses and all orders for this visit:    1. Medicare annual wellness visit, subsequent (Primary)  Assessment & Plan:  We discussed health maintenance, diet, exercise, and immunizations.    Orders:  -     CBC & Differential; Future  -     Comprehensive Metabolic Panel; Future  -     CBC & Differential  -     Comprehensive Metabolic Panel    2. Longstanding persistent atrial fibrillation  Assessment & Plan:  Stable on Eliquis.    Orders:  -     apixaban (Eliquis) 5 MG tablet tablet; Take 1 tablet by mouth 2 (Two) Times a Day.  Dispense: 60 tablet; Refill: 3  -     CBC & Differential; Future  -     Comprehensive Metabolic Panel; Future  -     CBC & Differential  -     Comprehensive Metabolic Panel    3. History of pulmonary embolism  Assessment & Plan:  Patient on Eliquis.    Orders:  -     apixaban (Eliquis) 5 MG tablet tablet; Take 1 tablet by mouth 2 (Two) Times a Day.  Dispense: 60 tablet; Refill: 3  -     CBC & Differential; Future  -     Comprehensive Metabolic Panel; Future  -     CBC & Differential  -     Comprehensive Metabolic Panel    4. Physical exam  Assessment & Plan:  We discussed health maintenance, diet, exercise, and immunizations.    Orders:  -     CBC & Differential; Future  -     Comprehensive Metabolic Panel; Future  -     CBC & Differential  -     Comprehensive Metabolic Panel    5. CKD (chronic kidney disease) stage 4, GFR 15-29 ml/min  -     CBC & Differential; Future  -     Comprehensive Metabolic Panel; Future  -     CBC & Differential  -     Comprehensive Metabolic Panel    6. Chronic diastolic congestive  heart failure  Assessment & Plan:  Stable on Eliquis    Orders:  -     CBC & Differential; Future  -     Comprehensive Metabolic Panel; Future  -     CBC & Differential  -     Comprehensive Metabolic Panel    7. Hypertension, unspecified type  Assessment & Plan:  Discussed with patient to monitor their blood pressure and if systolic blood pressure goes above 140 or diastolic is above 90 to return to clinic.  Take medicines as directed, call for any problems, patient not having or any worrisome symptoms.        Orders:  -     CBC & Differential; Future  -     Comprehensive Metabolic Panel; Future  -     CBC & Differential  -     Comprehensive Metabolic Panel    8. Mild dementia without behavioral disturbance, psychotic disturbance, mood disturbance, or anxiety, unspecified dementia type  Assessment & Plan:  This is stable.    Orders:  -     CBC & Differential; Future  -     Comprehensive Metabolic Panel; Future  -     CBC & Differential  -     Comprehensive Metabolic Panel    9. Long term (current) use of anticoagulants  Assessment & Plan:  Patient has to have 30 teeth pulled.  She is currently on Eliquis.  I discussed this with Dc Arredondo Pharm D at the hospital.  We are going to stop her Eliquis 2 days prior to the procedure.  And have her start it a day after as long as hemostasis has been achieved.  They are aware of the potential complications and risks of going off the anticoagulant.             I spent 10 minutes caring for Emily on this date of service. This time includes time spent by me in the following activities:preparing for the visit, reviewing tests, obtaining and/or reviewing a separately obtained history, performing a medically appropriate examination and/or evaluation , counseling and educating the patient/family/caregiver, ordering medications, tests, or procedures, referring and communicating with other health care professionals , documenting information in the medical record, independently  interpreting results and communicating that information with the patient/family/caregiver, and care coordination  Follow Up   Return in about 3 months (around 10/5/2024) for Video visit.  Patient was given instructions and counseling regarding her condition or for health maintenance advice. Please see specific information pulled into the AVS if appropriate.

## 2024-07-05 NOTE — ASSESSMENT & PLAN NOTE
Patient has to have 30 teeth pulled.  She is currently on Eliquis.  I discussed this with Dc Holt D at the hospital.  We are going to stop her Eliquis 2 days prior to the procedure.  And have her start it a day after as long as hemostasis has been achieved.  They are aware of the potential complications and risks of going off the anticoagulant.

## 2024-07-06 LAB
ALBUMIN SERPL-MCNC: 4.2 G/DL (ref 3.7–4.7)
ALP SERPL-CCNC: 69 IU/L (ref 44–121)
ALT SERPL-CCNC: 10 IU/L (ref 0–32)
AST SERPL-CCNC: 20 IU/L (ref 0–40)
BASOPHILS # BLD AUTO: 0.1 X10E3/UL (ref 0–0.2)
BASOPHILS NFR BLD AUTO: 1 %
BILIRUB SERPL-MCNC: 0.6 MG/DL (ref 0–1.2)
BUN SERPL-MCNC: 25 MG/DL (ref 8–27)
BUN/CREAT SERPL: 13 (ref 12–28)
CALCIUM SERPL-MCNC: 9.8 MG/DL (ref 8.7–10.3)
CHLORIDE SERPL-SCNC: 92 MMOL/L (ref 96–106)
CO2 SERPL-SCNC: 25 MMOL/L (ref 20–29)
CREAT SERPL-MCNC: 1.88 MG/DL (ref 0.57–1)
EGFRCR SERPLBLD CKD-EPI 2021: 25 ML/MIN/1.73
EOSINOPHIL # BLD AUTO: 0.3 X10E3/UL (ref 0–0.4)
EOSINOPHIL NFR BLD AUTO: 5 %
ERYTHROCYTE [DISTWIDTH] IN BLOOD BY AUTOMATED COUNT: 11.5 % (ref 11.7–15.4)
GLOBULIN SER CALC-MCNC: 2.8 G/DL (ref 1.5–4.5)
GLUCOSE SERPL-MCNC: 133 MG/DL (ref 70–99)
HCT VFR BLD AUTO: 31.2 % (ref 34–46.6)
HGB BLD-MCNC: 9.9 G/DL (ref 11.1–15.9)
IMM GRANULOCYTES # BLD AUTO: 0 X10E3/UL (ref 0–0.1)
IMM GRANULOCYTES NFR BLD AUTO: 1 %
LYMPHOCYTES # BLD AUTO: 0.8 X10E3/UL (ref 0.7–3.1)
LYMPHOCYTES NFR BLD AUTO: 12 %
MCH RBC QN AUTO: 32.1 PG (ref 26.6–33)
MCHC RBC AUTO-ENTMCNC: 31.7 G/DL (ref 31.5–35.7)
MCV RBC AUTO: 101 FL (ref 79–97)
MONOCYTES # BLD AUTO: 0.7 X10E3/UL (ref 0.1–0.9)
MONOCYTES NFR BLD AUTO: 11 %
NEUTROPHILS # BLD AUTO: 4.6 X10E3/UL (ref 1.4–7)
NEUTROPHILS NFR BLD AUTO: 70 %
PLATELET # BLD AUTO: 131 X10E3/UL (ref 150–450)
POTASSIUM SERPL-SCNC: 4.8 MMOL/L (ref 3.5–5.2)
PROT SERPL-MCNC: 7 G/DL (ref 6–8.5)
RBC # BLD AUTO: 3.08 X10E6/UL (ref 3.77–5.28)
SODIUM SERPL-SCNC: 135 MMOL/L (ref 134–144)
WBC # BLD AUTO: 6.5 X10E3/UL (ref 3.4–10.8)

## 2024-07-10 DIAGNOSIS — N32.81 OVERACTIVE BLADDER: ICD-10-CM

## 2024-07-11 ENCOUNTER — TELEPHONE (OUTPATIENT)
Dept: FAMILY MEDICINE CLINIC | Facility: CLINIC | Age: 89
End: 2024-07-11
Payer: MEDICARE

## 2024-07-11 DIAGNOSIS — N32.81 OVERACTIVE BLADDER: ICD-10-CM

## 2024-07-11 RX ORDER — ALENDRONATE SODIUM 70 MG/1
70 TABLET ORAL WEEKLY
Qty: 4 TABLET | Refills: 5 | Status: SHIPPED | OUTPATIENT
Start: 2024-07-11 | End: 2024-07-11 | Stop reason: SDUPTHER

## 2024-07-11 RX ORDER — ALENDRONATE SODIUM 70 MG/1
70 TABLET ORAL WEEKLY
Qty: 4 TABLET | Refills: 5 | Status: SHIPPED | OUTPATIENT
Start: 2024-07-11

## 2024-07-11 NOTE — TELEPHONE ENCOUNTER
Incoming Refill Request      Medication requested (name and dose): ALENDRONATE SODIUM 70MG    Pharmacy where request should be sent: [facility]     Additional details provided by patient: PT IS OUT    Best call back number: [phone]    Does the patient have less than a 3 day supply:  [x] Yes  [] No    Genaro Morris Rep  07/11/24, 10:37 EDT

## 2024-07-15 DIAGNOSIS — F41.9 ANXIETY: ICD-10-CM

## 2024-07-15 DIAGNOSIS — G62.9 NEUROPATHY: ICD-10-CM

## 2024-07-15 RX ORDER — ESCITALOPRAM OXALATE 20 MG/1
20 TABLET ORAL DAILY
Qty: 90 TABLET | Refills: 0 | Status: SHIPPED | OUTPATIENT
Start: 2024-07-15

## 2024-07-16 RX ORDER — GABAPENTIN 400 MG/1
400 CAPSULE ORAL 3 TIMES DAILY
Qty: 90 CAPSULE | Refills: 2 | Status: SHIPPED | OUTPATIENT
Start: 2024-07-16

## 2024-07-22 DIAGNOSIS — G89.29 OTHER CHRONIC PAIN: ICD-10-CM

## 2024-07-22 DIAGNOSIS — I10 HYPERTENSION, UNSPECIFIED TYPE: Chronic | ICD-10-CM

## 2024-07-22 DIAGNOSIS — K21.9 GASTROESOPHAGEAL REFLUX DISEASE WITHOUT ESOPHAGITIS: ICD-10-CM

## 2024-07-22 RX ORDER — FAMOTIDINE 40 MG/1
40 TABLET, FILM COATED ORAL
Qty: 30 TABLET | Refills: 0 | Status: SHIPPED | OUTPATIENT
Start: 2024-07-22

## 2024-07-22 RX ORDER — HYDROCODONE BITARTRATE AND ACETAMINOPHEN 10; 325 MG/1; MG/1
1 TABLET ORAL EVERY 8 HOURS PRN
Qty: 90 TABLET | Refills: 0 | Status: SHIPPED | OUTPATIENT
Start: 2024-07-22

## 2024-07-22 RX ORDER — SPIRONOLACTONE 25 MG/1
25 TABLET ORAL DAILY
Qty: 90 TABLET | Refills: 1 | Status: SHIPPED | OUTPATIENT
Start: 2024-07-22

## 2024-07-22 NOTE — TELEPHONE ENCOUNTER
Caller: COLLETT,PENNY    Relationship: Emergency Contact    Best call back number: [phone]     Requested Prescriptions:   Requested Prescriptions     Pending Prescriptions Disp Refills    famotidine (PEPCID) 40 MG tablet 30 tablet 0     Sig: Take 1 tablet by mouth every night at bedtime.    spironolactone (ALDACTONE) 25 MG tablet 90 tablet 1     Sig: Take 1 tablet by mouth Daily.    HYDROcodone-acetaminophen (NORCO)  MG per tablet 90 tablet 0     Sig: Take 1 tablet by mouth Every 8 (Eight) Hours As Needed for Moderate Pain.        Pharmacy where request should be sent: [facility]/PHARMACY #[id number] [facility] [address] - [phone]  - [phone]      Last office visit with prescribing clinician: 7/5/2024   Last telemedicine visit with prescribing clinician: 1/24/2024   Next office visit with prescribing clinician: 10/4/2024     Additional details provided by patient: PATIENT HAS ONE WEEK.     Does the patient have less than a 3 day supply:  [] Yes  [x] No    Would you like a call back once the refill request has been completed: [x] Yes [] No    If the office needs to give you a call back, can they leave a voicemail: [x] Yes [] No    Genaro Horvath Rep   07/22/24 10:32 EDT

## 2024-08-02 DIAGNOSIS — D64.9 ANEMIA, UNSPECIFIED TYPE: ICD-10-CM

## 2024-08-02 RX ORDER — FERROUS SULFATE 325(65) MG
1 TABLET ORAL 3 TIMES DAILY
Qty: 270 TABLET | Refills: 0 | Status: SHIPPED | OUTPATIENT
Start: 2024-08-02

## 2024-08-02 NOTE — TELEPHONE ENCOUNTER
Rx Refill Note    Requested Prescriptions     Pending Prescriptions Disp Refills    ferrous sulfate 325 (65 FE) MG tablet [Pharmacy Med Name: FERROUS SULFATE 325 MG TABLET] 270 tablet 0     Sig: TAKE 1 TABLET BY MOUTH THREE TIMES A DAY        Last office visit with prescribing clinician: 7/5/2024      Next office visit with prescribing clinician: 10/4/2024   Last labs:   Last refill: 05/06/2024   Pharmacy (be sure to add in Epic). correct

## 2024-08-21 ENCOUNTER — TELEPHONE (OUTPATIENT)
Dept: FAMILY MEDICINE CLINIC | Facility: CLINIC | Age: 89
End: 2024-08-21
Payer: MEDICARE

## 2024-08-21 NOTE — TELEPHONE ENCOUNTER
Caller: COLLETT,PENNY    Relationship: Emergency Contact    Best call back number: [phone]    What medication are you requesting:     What are your current symptoms:   PAINFUL URINATION, BURNING    How long have you been experiencing symptoms: OVERNIGHT    If a prescription is needed, what is your preferred pharmacy and phone number[id number] [place] [facility] [address] - [phone] [facility] [phone]      Additional notes:   GONZALEZ STATES THAT IT'S HARD TO GET PATIENT IN AND OUT OF THE CAR TO BRING HER IN FOR AN APPOINTMENT

## 2024-08-22 RX ORDER — CEPHALEXIN 500 MG/1
500 CAPSULE ORAL 4 TIMES DAILY
Qty: 40 CAPSULE | Refills: 0 | Status: SHIPPED | OUTPATIENT
Start: 2024-08-22

## 2024-09-02 DIAGNOSIS — I50.32 CHRONIC DIASTOLIC CONGESTIVE HEART FAILURE: Chronic | ICD-10-CM

## 2024-09-02 DIAGNOSIS — K21.9 GASTROESOPHAGEAL REFLUX DISEASE WITHOUT ESOPHAGITIS: ICD-10-CM

## 2024-09-03 RX ORDER — BUMETANIDE 1 MG/1
1 TABLET ORAL DAILY
Qty: 90 TABLET | Refills: 0 | Status: SHIPPED | OUTPATIENT
Start: 2024-09-03

## 2024-09-03 RX ORDER — FAMOTIDINE 40 MG/1
40 TABLET, FILM COATED ORAL
Qty: 90 TABLET | Refills: 0 | Status: SHIPPED | OUTPATIENT
Start: 2024-09-03

## 2024-09-03 NOTE — TELEPHONE ENCOUNTER
Rx Refill Note    Requested Prescriptions     Pending Prescriptions Disp Refills    bumetanide (BUMEX) 1 MG tablet [Pharmacy Med Name: BUMETANIDE 1 MG TABLET] 90 tablet 0     Sig: TAKE 1 TABLET BY MOUTH EVERY DAY    famotidine (PEPCID) 40 MG tablet [Pharmacy Med Name: FAMOTIDINE 40 MG TABLET] 90 tablet 0     Sig: TAKE 1 TABLET BY MOUTH EVERYDAY AT BEDTIME        Last office visit with prescribing clinician: 7/5/2024      Next office visit with prescribing clinician: 10/4/2024   Last labs:   Last refill: needs   Pharmacy (be sure to add in Epic). Correct      Dr. Ross patient

## 2024-10-04 ENCOUNTER — TELEMEDICINE (OUTPATIENT)
Dept: FAMILY MEDICINE CLINIC | Facility: CLINIC | Age: 89
End: 2024-10-04
Payer: MEDICARE

## 2024-10-04 VITALS — BODY MASS INDEX: 14.78 KG/M2 | HEIGHT: 68 IN

## 2024-10-04 DIAGNOSIS — I10 HYPERTENSION, UNSPECIFIED TYPE: Chronic | ICD-10-CM

## 2024-10-04 DIAGNOSIS — G89.29 OTHER CHRONIC PAIN: Primary | ICD-10-CM

## 2024-10-04 DIAGNOSIS — F41.9 ANXIETY: ICD-10-CM

## 2024-10-04 DIAGNOSIS — D64.9 ANEMIA, UNSPECIFIED TYPE: ICD-10-CM

## 2024-10-04 DIAGNOSIS — G60.8 HEREDITARY SENSORY NEUROPATHY: ICD-10-CM

## 2024-10-04 DIAGNOSIS — G62.9 NEUROPATHY: ICD-10-CM

## 2024-10-04 RX ORDER — FERROUS SULFATE 325(65) MG
1 TABLET ORAL 3 TIMES DAILY
Qty: 270 TABLET | Refills: 0 | Status: SHIPPED | OUTPATIENT
Start: 2024-10-04

## 2024-10-04 RX ORDER — HYDROCODONE BITARTRATE AND ACETAMINOPHEN 10; 325 MG/1; MG/1
1 TABLET ORAL EVERY 8 HOURS PRN
Qty: 90 TABLET | Refills: 0 | Status: SHIPPED | OUTPATIENT
Start: 2024-10-04

## 2024-10-04 RX ORDER — GABAPENTIN 400 MG/1
400 CAPSULE ORAL 3 TIMES DAILY
Qty: 90 CAPSULE | Refills: 2 | Status: SHIPPED | OUTPATIENT
Start: 2024-10-04

## 2024-10-04 NOTE — ASSESSMENT & PLAN NOTE
Refill hydrocodone.  Patient is at home being cared for by caregivers.  We tried to get on the video phone but there was a problem with the connection.  I talked to her and she states she is doing okay but just hurting.  She has to get teeth pulled.  I will refill her pain medicine and recheck in 1 month.  They are monitoring her intake output and blood pressure.

## 2024-10-04 NOTE — PROGRESS NOTES
Telehealth E-Visit      Patient Name: Emily Green  : 1935   MRN: [id number]     Chief Complaint:    Chief Complaint   Patient presents with    Follow-up       I have reviewed the E-Visit questionnaire and the patient's answers, my assessment and plan are listed below.   You have chosen to receive care through a telehealth visit.  Do you consent to use a video/audio connection for your medical care today? Yes     History of Present Illness: Emily Green is a 89 y.o. female who is here today to follow up with medication for pain      Subjective      Review of Systems:   Review of Systems   Constitutional: Negative.    HENT: Negative.     Eyes: Negative.    Respiratory: Negative.     Cardiovascular: Negative.    Gastrointestinal: Negative.    Neurological: Negative.        I have reviewed and the following portions of the patient's history were updated as appropriate: past family history, past medical history, past social history, past surgical history and problem list.    Medications:     Current Outpatient Medications:     ferrous sulfate 325 (65 FE) MG tablet, Take 1 tablet by mouth 3 (Three) Times a Day., Disp: 270 tablet, Rfl: 0    gabapentin (NEURONTIN) 400 MG capsule, Take 1 capsule by mouth 3 (Three) Times a Day., Disp: 90 capsule, Rfl: 2    HYDROcodone-acetaminophen (NORCO)  MG per tablet, Take 1 tablet by mouth Every 8 (Eight) Hours As Needed for Moderate Pain., Disp: 90 tablet, Rfl: 0    alendronate (FOSAMAX) 70 MG tablet, Take 1 tablet by mouth 1 (One) Time Per Week., Disp: 4 tablet, Rfl: 5    amLODIPine (NORVASC) 5 MG tablet, Take 1 tablet by mouth Daily., Disp: 90 tablet, Rfl: 1    apixaban (Eliquis) 5 MG tablet tablet, Take 1 tablet by mouth 2 (Two) Times a Day., Disp: 60 tablet, Rfl: 3    atorvastatin (LIPITOR) 20 MG tablet, Take 1 tablet by mouth Daily., Disp: 90 tablet, Rfl: 1    bumetanide (BUMEX) 1 MG tablet, TAKE 1 TABLET BY MOUTH EVERY DAY, Disp: 90 tablet, Rfl: 0     "carvedilol (COREG) 6.25 MG tablet, Take 1 tablet by mouth 2 (Two) Times a Day With Meals., Disp: 180 tablet, Rfl: 1    cephalexin (Keflex) 500 MG capsule, Take 1 capsule by mouth 4 (Four) Times a Day., Disp: 40 capsule, Rfl: 0    escitalopram (LEXAPRO) 20 MG tablet, TAKE 1 TABLET BY MOUTH EVERY DAY, Disp: 90 tablet, Rfl: 0    famotidine (PEPCID) 40 MG tablet, TAKE 1 TABLET BY MOUTH EVERYDAY AT BEDTIME, Disp: 90 tablet, Rfl: 0    meclizine (ANTIVERT) 25 MG tablet, Take 1 tablet by mouth 3 (Three) Times a Day As Needed for Dizziness., Disp: 90 tablet, Rfl: 11    potassium chloride (KLOR-CON) 20 MEQ CR tablet, Take 1 tablet by mouth Daily., Disp: 90 tablet, Rfl: 1    sildenafil (REVATIO) 20 MG tablet, Take 1 tablet by mouth 3 (Three) Times a Day., Disp: 90 tablet, Rfl: 5    spironolactone (ALDACTONE) 25 MG tablet, Take 1 tablet by mouth Daily., Disp: 90 tablet, Rfl: 1    Allergies:   Allergies   Allergen Reactions    Codeine Nausea And Vomiting    Fludrocortisone Unknown - High Severity    Nitrofurantoin Nausea And Vomiting    Nitrofurantoin Macrocrystal Nausea And Vomiting    Sulfa Antibiotics Unknown (See Comments)       Objective     Physical Exam:  Vital Signs:   Vitals:    10/04/24 1519   Height: 172.7 cm (68\")     Body mass index is 14.78 kg/m².    Physical Exam  Neurological:      Mental Status: She is alert.   Psychiatric:         Mood and Affect: Mood normal.         Behavior: Behavior normal.         Thought Content: Thought content normal.         Judgment: Judgment normal.         Assessment / Plan      Assessment/Plan:   Diagnoses and all orders for this visit:    1. Other chronic pain (Primary)  Assessment & Plan:  Refill hydrocodone.  Patient is at home being cared for by caregivers.  We tried to get on the video phone but there was a problem with the connection.  I talked to her and she states she is doing okay but just hurting.  She has to get teeth pulled.  I will refill her pain medicine and recheck " in 1 month.  They are monitoring her intake output and blood pressure.    Orders:  -     HYDROcodone-acetaminophen (NORCO)  MG per tablet; Take 1 tablet by mouth Every 8 (Eight) Hours As Needed for Moderate Pain.  Dispense: 90 tablet; Refill: 0    2. Neuropathy  Assessment & Plan:  Refill gabapentin.  We will have her sign at hospital and paperwork in 1 month.    Orders:  -     gabapentin (NEURONTIN) 400 MG capsule; Take 1 capsule by mouth 3 (Three) Times a Day.  Dispense: 90 capsule; Refill: 2    3. Anxiety  Assessment & Plan:  Able      4. Hereditary sensory neuropathy  Assessment & Plan:  Will refill Patient's gabapentin      5. Hypertension, unspecified type  Assessment & Plan:  Discussed with patient to monitor their blood pressure and if systolic blood pressure goes above 140 or diastolic is above 90 to return to clinic.  Take medicines as directed, call for any problems, patient not having or any worrisome symptoms.          6. Anemia, unspecified type  -     ferrous sulfate 325 (65 FE) MG tablet; Take 1 tablet by mouth 3 (Three) Times a Day.  Dispense: 270 tablet; Refill: 0         Follow Up:   Return in about 1 month (around 11/4/2024) for Recheck.    Any medications prescribed have been sent electronically to   [facility]/pharmacy #[id number] - [place], [place] - [address] - [phone]  - [phone]   [address] [place] [address]  Phone: [phone] Fax: [phone]        Zeeshan Ross MD  [facility]   10/04/24  15:23 EDT

## 2024-10-11 DIAGNOSIS — F41.9 ANXIETY: ICD-10-CM

## 2024-10-11 RX ORDER — ESCITALOPRAM OXALATE 20 MG/1
20 TABLET ORAL DAILY
Qty: 90 TABLET | Refills: 0 | Status: SHIPPED | OUTPATIENT
Start: 2024-10-11

## 2024-11-06 ENCOUNTER — OFFICE VISIT (OUTPATIENT)
Dept: FAMILY MEDICINE CLINIC | Facility: CLINIC | Age: 89
End: 2024-11-06
Payer: MEDICARE

## 2024-11-06 VITALS
DIASTOLIC BLOOD PRESSURE: 74 MMHG | SYSTOLIC BLOOD PRESSURE: 100 MMHG | BODY MASS INDEX: 12.67 KG/M2 | HEART RATE: 82 BPM | OXYGEN SATURATION: 98 % | WEIGHT: 83.6 LBS | HEIGHT: 68 IN

## 2024-11-06 DIAGNOSIS — D64.9 ANEMIA, UNSPECIFIED TYPE: ICD-10-CM

## 2024-11-06 DIAGNOSIS — I50.32 CHRONIC DIASTOLIC CONGESTIVE HEART FAILURE: Chronic | ICD-10-CM

## 2024-11-06 DIAGNOSIS — N32.81 OVERACTIVE BLADDER: ICD-10-CM

## 2024-11-06 DIAGNOSIS — E78.2 MODERATE MIXED HYPERLIPIDEMIA NOT REQUIRING STATIN THERAPY: Chronic | ICD-10-CM

## 2024-11-06 DIAGNOSIS — I27.20 PULMONARY HYPERTENSION: Primary | ICD-10-CM

## 2024-11-06 DIAGNOSIS — I10 HYPERTENSION, UNSPECIFIED TYPE: Chronic | ICD-10-CM

## 2024-11-06 DIAGNOSIS — K21.9 GASTROESOPHAGEAL REFLUX DISEASE WITHOUT ESOPHAGITIS: ICD-10-CM

## 2024-11-06 DIAGNOSIS — G89.29 OTHER CHRONIC PAIN: ICD-10-CM

## 2024-11-06 DIAGNOSIS — I50.31 ACUTE DIASTOLIC CONGESTIVE HEART FAILURE: ICD-10-CM

## 2024-11-06 DIAGNOSIS — G62.9 NEUROPATHY: ICD-10-CM

## 2024-11-06 DIAGNOSIS — Z86.711 HISTORY OF PULMONARY EMBOLISM: ICD-10-CM

## 2024-11-06 DIAGNOSIS — F41.9 ANXIETY: ICD-10-CM

## 2024-11-06 DIAGNOSIS — I48.11 LONGSTANDING PERSISTENT ATRIAL FIBRILLATION: Chronic | ICD-10-CM

## 2024-11-06 PROCEDURE — 1125F AMNT PAIN NOTED PAIN PRSNT: CPT | Performed by: FAMILY MEDICINE

## 2024-11-06 PROCEDURE — 90662 IIV NO PRSV INCREASED AG IM: CPT | Performed by: FAMILY MEDICINE

## 2024-11-06 PROCEDURE — G0008 ADMIN INFLUENZA VIRUS VAC: HCPCS | Performed by: FAMILY MEDICINE

## 2024-11-06 PROCEDURE — 99214 OFFICE O/P EST MOD 30 MIN: CPT | Performed by: FAMILY MEDICINE

## 2024-11-06 RX ORDER — GABAPENTIN 400 MG/1
400 CAPSULE ORAL 3 TIMES DAILY
Qty: 90 CAPSULE | Refills: 2 | Status: SHIPPED | OUTPATIENT
Start: 2024-11-06

## 2024-11-06 RX ORDER — ALENDRONATE SODIUM 70 MG/1
70 TABLET ORAL WEEKLY
Qty: 4 TABLET | Refills: 5 | Status: SHIPPED | OUTPATIENT
Start: 2024-11-06

## 2024-11-06 RX ORDER — MECLIZINE HYDROCHLORIDE 25 MG/1
25 TABLET ORAL 3 TIMES DAILY PRN
Qty: 90 TABLET | Refills: 11 | Status: SHIPPED | OUTPATIENT
Start: 2024-11-06

## 2024-11-06 RX ORDER — ATORVASTATIN CALCIUM 20 MG/1
20 TABLET, FILM COATED ORAL DAILY
Qty: 90 TABLET | Refills: 1 | Status: SHIPPED | OUTPATIENT
Start: 2024-11-06

## 2024-11-06 RX ORDER — ESCITALOPRAM OXALATE 20 MG/1
20 TABLET ORAL DAILY
Qty: 90 TABLET | Refills: 0 | Status: SHIPPED | OUTPATIENT
Start: 2024-11-06

## 2024-11-06 RX ORDER — AMLODIPINE BESYLATE 5 MG/1
5 TABLET ORAL DAILY
Qty: 90 TABLET | Refills: 1 | Status: SHIPPED | OUTPATIENT
Start: 2024-11-06

## 2024-11-06 RX ORDER — FAMOTIDINE 40 MG/1
40 TABLET, FILM COATED ORAL
Qty: 90 TABLET | Refills: 0 | Status: SHIPPED | OUTPATIENT
Start: 2024-11-06

## 2024-11-06 RX ORDER — POTASSIUM CHLORIDE 1500 MG/1
20 TABLET, EXTENDED RELEASE ORAL DAILY
Qty: 90 TABLET | Refills: 1 | Status: SHIPPED | OUTPATIENT
Start: 2024-11-06

## 2024-11-06 RX ORDER — HYDROCODONE BITARTRATE AND ACETAMINOPHEN 10; 325 MG/1; MG/1
1 TABLET ORAL EVERY 8 HOURS PRN
Qty: 90 TABLET | Refills: 0 | Status: SHIPPED | OUTPATIENT
Start: 2024-11-06

## 2024-11-06 RX ORDER — SILDENAFIL CITRATE 20 MG/1
20 TABLET ORAL 3 TIMES DAILY
Qty: 90 TABLET | Refills: 5 | Status: SHIPPED | OUTPATIENT
Start: 2024-11-06

## 2024-11-06 RX ORDER — CARVEDILOL 6.25 MG/1
6.25 TABLET ORAL 2 TIMES DAILY WITH MEALS
Qty: 180 TABLET | Refills: 1 | Status: SHIPPED | OUTPATIENT
Start: 2024-11-06

## 2024-11-06 RX ORDER — SPIRONOLACTONE 25 MG/1
25 TABLET ORAL DAILY
Qty: 90 TABLET | Refills: 1 | Status: SHIPPED | OUTPATIENT
Start: 2024-11-06

## 2024-11-06 RX ORDER — BUMETANIDE 1 MG/1
1 TABLET ORAL DAILY
Qty: 90 TABLET | Refills: 0 | Status: SHIPPED | OUTPATIENT
Start: 2024-11-06

## 2024-11-06 NOTE — PROGRESS NOTES
Patient Name: Emily Green  : 1935   MRN: [id number]     Chief Complaint:    Chief Complaint   Patient presents with    Med Refill    RT Hip Pain    Constipation       History of Present Illness: Emily Green is a 89 y.o. female who is here today for follow up on pain, anemia and CKD  HPI        Review of Systems:   Review of Systems   Constitutional: Negative.    HENT: Negative.     Eyes: Negative.    Respiratory: Negative.     Cardiovascular: Negative.    Gastrointestinal: Negative.    Neurological: Negative.         Past Medical History:   Past Medical History:   Diagnosis Date    Allergy     SEASONAL    Anemia 2017    Anxiety     Arthritis     Atrial fibrillation 2017    CHRONIC    Auditory effects     DECREASED AUDITORY ACUITY    C. difficile colitis     Cardiomegaly     Carotid artery stenosis     Carpal tunnel syndrome     CHF (congestive heart failure) 2017    Chronic diarrhea     Chronic neck pain     Chronic obstructive lung disease     Chronic obstructive lung disease 6/15/2022    CKD (chronic kidney disease) stage 4, GFR 15-29 ml/min 3/27/2023    CVA (cerebral vascular accident)     Degenerative arthritis of hip     left, not a candidate for operation    Dementia     Dementia     Depression     Dizziness     Dysrhythmias     CARDIAC    Early cataract     Fatigue     CHRONIC    Gastroesophageal reflux disease without esophagitis     Headache     Hereditary sensory neuropathy     High cholesterol     High risk medication use     History of cerebrovascular accident     History of pulmonary embolus (PE)     Hyperlipidemia 2017    Hypertension 2017    ESSENTIAL    Hypokalemia 2017    Hypothyroidism 2017    acquired    Left carotid stenosis 2017    Long term current use of anticoagulant     Neuropathy     Osteoporosis     Paroxysmal atrial fibrillation     Peripheral vascular disease     Posterior circulation stroke     WITH VERTIGO    Pulmonary  hypertension     Recurrent major depression in partial remission     Seizures     Thinks may have hx seizures    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    Thyroid disease     TIA (transient ischemic attack)     history    Vertebral artery occlusion, left     Vertigo        Past Surgical History:   Past Surgical History:   Procedure Laterality Date    ANGIOPLASTY  2011    OF ARTERY OF LOWER EXTREMITY    APPENDECTOMY      CAROTID ENDARTERECTOMY Right     ~1999 deborah    HEMORRHOIDECTOMY      HYSTERECTOMY      OTHER SURGICAL HISTORY  11/2017    TRANSCATHETER AORTIC VALVE REPLACEMENT    OTHER SURGICAL HISTORY  2004    BLOOD TRANSFUSION    REPLACEMENT TOTAL HIP LATERAL POSITION Right     UNKNOWN DETAILS 2008, 2004    TOTAL ABDOMINAL HYSTERECTOMY WITH SALPINGO OOPHORECTOMY      AT AGE 38       Family History:   Family History   Problem Relation Age of Onset    High cholesterol Mother     Osteoporosis Mother     Stroke Mother     Other Mother         CARDIOVASCULAR DISEASE    Hearing loss Mother     Heart disease Mother     Hypertension Mother     Arthritis Mother     Other Father         CARDIOVASCULAR DISEASE    Heart disease Father     Hypertension Father     Arthritis Father     High cholesterol Father     Osteoporosis Father     Hearing loss Father     Irritable bowel syndrome Father     Stroke Father     Hearing loss Sister     Hypertension Sister     High cholesterol Sister     Osteoporosis Sister     Arthritis Brother     Other Brother         CARDIOVASCULAR DISEASE    Kidney nephrosis Daughter     Hypertension Other     Cancer Other         GASTRIC    Cancer Other         COLON CANCER       Social History:   Social History     Socioeconomic History    Marital status:    Tobacco Use    Smoking status: Never    Smokeless tobacco: Never   Vaping Use    Vaping status: Never Used   Substance and Sexual Activity    Alcohol use: Never    Drug use: Never    Sexual activity: Defer     Partners: Female       Medications:      Current Outpatient Medications:     alendronate (FOSAMAX) 70 MG tablet, Take 1 tablet by mouth 1 (One) Time Per Week., Disp: 4 tablet, Rfl: 5    amLODIPine (NORVASC) 5 MG tablet, Take 1 tablet by mouth Daily., Disp: 90 tablet, Rfl: 1    apixaban (Eliquis) 5 MG tablet tablet, Take 1 tablet by mouth 2 (Two) Times a Day., Disp: 60 tablet, Rfl: 3    atorvastatin (LIPITOR) 20 MG tablet, Take 1 tablet by mouth Daily., Disp: 90 tablet, Rfl: 1    bumetanide (BUMEX) 1 MG tablet, Take 1 tablet by mouth Daily., Disp: 90 tablet, Rfl: 0    carvedilol (COREG) 6.25 MG tablet, Take 1 tablet by mouth 2 (Two) Times a Day With Meals., Disp: 180 tablet, Rfl: 1    escitalopram (LEXAPRO) 20 MG tablet, Take 1 tablet by mouth Daily., Disp: 90 tablet, Rfl: 0    famotidine (PEPCID) 40 MG tablet, Take 1 tablet by mouth every night at bedtime., Disp: 90 tablet, Rfl: 0    gabapentin (NEURONTIN) 400 MG capsule, Take 1 capsule by mouth 3 (Three) Times a Day., Disp: 90 capsule, Rfl: 2    HYDROcodone-acetaminophen (NORCO)  MG per tablet, Take 1 tablet by mouth Every 8 (Eight) Hours As Needed for Moderate Pain., Disp: 90 tablet, Rfl: 0    meclizine (ANTIVERT) 25 MG tablet, Take 1 tablet by mouth 3 (Three) Times a Day As Needed for Dizziness., Disp: 90 tablet, Rfl: 11    potassium chloride (Klor-Con M20) 20 MEQ CR tablet, Take 1 tablet by mouth Daily., Disp: 90 tablet, Rfl: 1    sildenafil (REVATIO) 20 MG tablet, Take 1 tablet by mouth 3 (Three) Times a Day., Disp: 90 tablet, Rfl: 5    spironolactone (ALDACTONE) 25 MG tablet, Take 1 tablet by mouth Daily., Disp: 90 tablet, Rfl: 1    ferrous sulfate 325 (65 FE) MG tablet, Take 1 tablet by mouth 3 (Three) Times a Day., Disp: 270 tablet, Rfl: 0    Allergies:   Allergies   Allergen Reactions    Codeine Nausea And Vomiting    Fludrocortisone Unknown - High Severity    Nitrofurantoin Nausea And Vomiting    Nitrofurantoin Macrocrystal Nausea And Vomiting    Sulfa Antibiotics Unknown (See Comments)  "        Physical Exam:  Vital Signs:   Vitals:    11/06/24 1358   BP: 100/74   BP Location: Left arm   Patient Position: Sitting   Cuff Size: Adult   Pulse: 82   SpO2: 98%   Weight: 37.9 kg (83 lb 9.6 oz)   Height: 172.7 cm (67.99\")   PainSc:   4   PainLoc: Hip     Body mass index is 12.71 kg/m².     Physical Exam  Vitals and nursing note reviewed.   Constitutional:       Appearance: Normal appearance. She is normal weight.   HENT:      Head: Normocephalic and atraumatic.      Right Ear: Tympanic membrane, ear canal and external ear normal.      Left Ear: Tympanic membrane, ear canal and external ear normal.      Nose: Nose normal.      Mouth/Throat:      Mouth: Mucous membranes are dry.      Pharynx: Oropharynx is clear.   Eyes:      Extraocular Movements: Extraocular movements intact.      Conjunctiva/sclera: Conjunctivae normal.      Pupils: Pupils are equal, round, and reactive to light.   Cardiovascular:      Rate and Rhythm: Normal rate and regular rhythm.      Pulses: Normal pulses.      Heart sounds: Normal heart sounds.   Pulmonary:      Effort: Pulmonary effort is normal.      Breath sounds: Normal breath sounds.   Musculoskeletal:      Cervical back: Normal range of motion and neck supple.   Feet:      Comments:      Neurological:      Mental Status: She is alert.         Procedures      Assessment/Plan:   Diagnoses and all orders for this visit:    1. Pulmonary hypertension (Primary)  Assessment & Plan:  Continue sildenafil.      2. Neuropathy  -     gabapentin (NEURONTIN) 400 MG capsule; Take 1 capsule by mouth 3 (Three) Times a Day.  Dispense: 90 capsule; Refill: 2    3. Other chronic pain  Assessment & Plan:  Refilled hydrocodone.  House bill 1.    Orders:  -     HYDROcodone-acetaminophen (NORCO)  MG per tablet; Take 1 tablet by mouth Every 8 (Eight) Hours As Needed for Moderate Pain.  Dispense: 90 tablet; Refill: 0    4. Overactive bladder  -     alendronate (FOSAMAX) 70 MG tablet; Take 1 " tablet by mouth 1 (One) Time Per Week.  Dispense: 4 tablet; Refill: 5    5. Hypertension, unspecified type  Assessment & Plan:  Discussed with patient to monitor their blood pressure and if systolic blood pressure goes above 140 or diastolic is above 90 to return to clinic.  Take medicines as directed, call for any problems, patient not having or any worrisome symptoms.        Orders:  -     amLODIPine (NORVASC) 5 MG tablet; Take 1 tablet by mouth Daily.  Dispense: 90 tablet; Refill: 1  -     spironolactone (ALDACTONE) 25 MG tablet; Take 1 tablet by mouth Daily.  Dispense: 90 tablet; Refill: 1    6. Longstanding persistent atrial fibrillation  Assessment & Plan:  Stable on Eliquis.    Orders:  -     apixaban (Eliquis) 5 MG tablet tablet; Take 1 tablet by mouth 2 (Two) Times a Day.  Dispense: 60 tablet; Refill: 3    7. History of pulmonary embolism  Assessment & Plan:  Patient on Eliquis.  Taking 2.5 mg twice a day    Orders:  -     apixaban (Eliquis) 5 MG tablet tablet; Take 1 tablet by mouth 2 (Two) Times a Day.  Dispense: 60 tablet; Refill: 3    8. Moderate mixed hyperlipidemia not requiring statin therapy  -     atorvastatin (LIPITOR) 20 MG tablet; Take 1 tablet by mouth Daily.  Dispense: 90 tablet; Refill: 1    9. Chronic diastolic congestive heart failure  Assessment & Plan:  Stable on Eliquis, spironolactone, sildenafil, Bumex, and carvedilol.    Orders:  -     bumetanide (BUMEX) 1 MG tablet; Take 1 tablet by mouth Daily.  Dispense: 90 tablet; Refill: 0    10. Anxiety  -     escitalopram (LEXAPRO) 20 MG tablet; Take 1 tablet by mouth Daily.  Dispense: 90 tablet; Refill: 0    11. Anemia, unspecified type  Assessment & Plan:  Blood work has been stable.  Continue iron therapy.  Recheck Blood work in 3 months.      12. Gastroesophageal reflux disease without esophagitis  -     famotidine (PEPCID) 40 MG tablet; Take 1 tablet by mouth every night at bedtime.  Dispense: 90 tablet; Refill: 0    13. Acute diastolic  congestive heart failure  Assessment & Plan:  Stable on Eliquis, spironolactone, sildenafil, Bumex, and carvedilol.    Orders:  -     potassium chloride (Klor-Con M20) 20 MEQ CR tablet; Take 1 tablet by mouth Daily.  Dispense: 90 tablet; Refill: 1    Other orders  -     carvedilol (COREG) 6.25 MG tablet; Take 1 tablet by mouth 2 (Two) Times a Day With Meals.  Dispense: 180 tablet; Refill: 1  -     meclizine (ANTIVERT) 25 MG tablet; Take 1 tablet by mouth 3 (Three) Times a Day As Needed for Dizziness.  Dispense: 90 tablet; Refill: 11  -     sildenafil (REVATIO) 20 MG tablet; Take 1 tablet by mouth 3 (Three) Times a Day.  Dispense: 90 tablet; Refill: 5  -     Fluzone High-Dose 65+yrs             Follow Up:   Return in about 1 month (around 12/6/2024) for Video visit.      Zeeshan Ross MD  [facility]

## 2024-11-15 ENCOUNTER — TELEPHONE (OUTPATIENT)
Dept: FAMILY MEDICINE CLINIC | Facility: CLINIC | Age: 89
End: 2024-11-15
Payer: MEDICARE

## 2024-11-15 NOTE — TELEPHONE ENCOUNTER
Patient has to have an appointment to discuss oxygen therapy for renewal. I can find nothing in any note stating she needs it ... It can be a v-v

## 2024-11-18 ENCOUNTER — TELEPHONE (OUTPATIENT)
Dept: FAMILY MEDICINE CLINIC | Facility: CLINIC | Age: 89
End: 2024-11-18
Payer: MEDICARE

## 2024-11-18 NOTE — TELEPHONE ENCOUNTER
HUB TO RELAY     Patient has to have an appointment   to discuss oxygen therapy for renewal.   I can find nothing in any note stating she   needs it ... It can be a v-v       PLEASE ADVISE AND SCHEDULE.

## 2025-01-24 ENCOUNTER — TELEPHONE (OUTPATIENT)
Dept: FAMILY MEDICINE CLINIC | Facility: CLINIC | Age: OVER 89
End: 2025-01-24

## 2025-01-24 NOTE — TELEPHONE ENCOUNTER
Caller: COLLETT,PENNY    Relationship to patient: Emergency Contact      Best call back number: [phone]     Provider: GUILLERMINA MADDOX MD     Medication PA needed:     apixaban (Eliquis) 5 MG tablet tablet       Reason for call/Prior Auth: PATIENT'S (CARE GIVER) GONZALEZ STATED THAT PATIENT'S INSURANCE IS NOT COVERING MEDICATION AND PATIENT IS COMPLETELY OUT OF MEDICATION AT THIS TIME AND A PRIOR AUTHORIZATION NEEDS TO BE PLACED OR A ALTERNATIVE MEDICATION NEEDS TO BE CALLED INTO THE PHARMACY FOR THE PATIENT     [facility]/pharmacy #[id number] - [place], [place] - [address] - [phone]  - [phone]  [phone]

## 2025-01-24 NOTE — TELEPHONE ENCOUNTER
PA came back that medication was available without a PA. If pharmacy is still requesting, please let me know so I can call the insurance.

## 2025-02-03 ENCOUNTER — TELEPHONE (OUTPATIENT)
Dept: FAMILY MEDICINE CLINIC | Facility: CLINIC | Age: OVER 89
End: 2025-02-03
Payer: MEDICARE

## 2025-02-03 DIAGNOSIS — G89.29 OTHER CHRONIC PAIN: ICD-10-CM

## 2025-02-03 RX ORDER — HYDROCODONE BITARTRATE AND ACETAMINOPHEN 10; 325 MG/1; MG/1
1 TABLET ORAL EVERY 8 HOURS PRN
Qty: 90 TABLET | Refills: 0 | Status: SHIPPED | OUTPATIENT
Start: 2025-02-03

## 2025-02-03 NOTE — TELEPHONE ENCOUNTER
Caller: PEÑA - CAREGIVER  SHE IS ON  VERBAL    Relationship:     Best call back number: [phone]    Requested Prescriptions:   Requested Prescriptions      No prescriptions requested or ordered in this encounter      amLODIPine (NORVASC) 5 MG tablet     HYDROcodone-acetaminophen (NORCO)  MG per tablet     Pharmacy where request should be sent: [id number] [address] - [phone]  - [phone] FX     Last office visit with prescribing clinician: 11/6/2024   Last telemedicine visit with prescribing clinician: 10/4/2024   Next office visit with prescribing clinician: 2/6/2025     Additional details provided by patient:     ONLY HAS TWO DAYS LEFT.  HAS AN APPOINTMENT 2/6/25    Does the patient have less than a 3 day supply:  [x] Yes  [] No    Would you like a call back once the refill request has been completed: [x] Yes [] No    If the office needs to give you a call back, can they leave a voicemail: [] Yes [x] No    Elly Berg, PCT   02/03/25 10:36 EST

## 2025-02-04 DIAGNOSIS — I10 HYPERTENSION, UNSPECIFIED TYPE: Chronic | ICD-10-CM

## 2025-02-04 RX ORDER — AMLODIPINE BESYLATE 5 MG/1
5 TABLET ORAL DAILY
Qty: 90 TABLET | Refills: 1 | OUTPATIENT
Start: 2025-02-04

## 2025-02-04 NOTE — TELEPHONE ENCOUNTER
Caller: PEÑA -    Relationship: CAREGIVER    Best call back number: [phone]    Requested Prescriptions:   Requested Prescriptions     Pending Prescriptions Disp Refills    amLODIPine (NORVASC) 5 MG tablet 90 tablet 1     Sig: Take 1 tablet by mouth Daily.        Pharmacy where request should be sent: [facility]/PHARMACY #[id number] - [facility] [address] - [phone]  - [phone] FX     Last office visit with prescribing clinician: 11/6/2024   Last telemedicine visit with prescribing clinician: 10/4/2024   Next office visit with prescribing clinician: 2/6/2025     Additional details provided by patient:     Does the patient have less than a 3 day supply:  [x] Yes  [] No    Would you like a call back once the refill request has been completed: [] Yes [x] No    If the office needs to give you a call back, can they leave a voicemail: [] Yes [x] No    Genaro Bernabe Rep   02/04/25 10:48 EST

## 2025-02-05 RX ORDER — FERROUS SULFATE 325(65) MG
1 TABLET ORAL 3 TIMES DAILY
Qty: 270 TABLET | Refills: 0 | Status: CANCELLED | OUTPATIENT
Start: 2025-02-05

## 2025-02-05 RX ORDER — BUMETANIDE 1 MG/1
1 TABLET ORAL DAILY
Qty: 90 TABLET | Refills: 0 | Status: CANCELLED | OUTPATIENT
Start: 2025-02-05

## 2025-02-05 RX ORDER — HYDROCODONE BITARTRATE AND ACETAMINOPHEN 10; 325 MG/1; MG/1
1 TABLET ORAL EVERY 8 HOURS PRN
Qty: 90 TABLET | Refills: 0 | Status: CANCELLED | OUTPATIENT
Start: 2025-02-05

## 2025-02-05 RX ORDER — GABAPENTIN 400 MG/1
400 CAPSULE ORAL 3 TIMES DAILY
Qty: 90 CAPSULE | Refills: 2 | Status: CANCELLED | OUTPATIENT
Start: 2025-02-05

## 2025-02-05 RX ORDER — ESCITALOPRAM OXALATE 20 MG/1
20 TABLET ORAL DAILY
Qty: 90 TABLET | Refills: 0 | Status: CANCELLED | OUTPATIENT
Start: 2025-02-05

## 2025-02-05 RX ORDER — FAMOTIDINE 40 MG/1
40 TABLET, FILM COATED ORAL
Qty: 90 TABLET | Refills: 0 | Status: CANCELLED | OUTPATIENT
Start: 2025-02-05

## 2025-02-06 ENCOUNTER — TELEMEDICINE (OUTPATIENT)
Dept: FAMILY MEDICINE CLINIC | Facility: CLINIC | Age: OVER 89
End: 2025-02-06
Payer: MEDICARE

## 2025-02-06 VITALS
HEIGHT: 68 IN | DIASTOLIC BLOOD PRESSURE: 57 MMHG | BODY MASS INDEX: 13.43 KG/M2 | HEART RATE: 72 BPM | WEIGHT: 88.6 LBS | OXYGEN SATURATION: 98 % | SYSTOLIC BLOOD PRESSURE: 121 MMHG

## 2025-02-06 DIAGNOSIS — D64.9 ANEMIA, UNSPECIFIED TYPE: ICD-10-CM

## 2025-02-06 DIAGNOSIS — K21.9 GASTROESOPHAGEAL REFLUX DISEASE WITHOUT ESOPHAGITIS: ICD-10-CM

## 2025-02-06 DIAGNOSIS — Z79.899 HIGH RISK MEDICATION USE: ICD-10-CM

## 2025-02-06 DIAGNOSIS — F41.9 ANXIETY: ICD-10-CM

## 2025-02-06 DIAGNOSIS — I50.32 CHRONIC DIASTOLIC CONGESTIVE HEART FAILURE: Chronic | ICD-10-CM

## 2025-02-06 DIAGNOSIS — I10 HYPERTENSION, UNSPECIFIED TYPE: Chronic | ICD-10-CM

## 2025-02-06 DIAGNOSIS — G89.29 OTHER CHRONIC PAIN: ICD-10-CM

## 2025-02-06 DIAGNOSIS — G62.9 NEUROPATHY: ICD-10-CM

## 2025-02-06 DIAGNOSIS — N18.4 CKD (CHRONIC KIDNEY DISEASE) STAGE 4, GFR 15-29 ML/MIN: Primary | ICD-10-CM

## 2025-02-06 PROCEDURE — 1125F AMNT PAIN NOTED PAIN PRSNT: CPT | Performed by: FAMILY MEDICINE

## 2025-02-06 PROCEDURE — 99214 OFFICE O/P EST MOD 30 MIN: CPT | Performed by: FAMILY MEDICINE

## 2025-02-06 NOTE — ASSESSMENT & PLAN NOTE
Stable on Eliquis, spironolactone, sildenafil, Bumex, and carvedilol.  Patient also on chronic oxygen therapy.

## 2025-02-06 NOTE — ASSESSMENT & PLAN NOTE
Discussed with patient to monitor their blood pressure and if systolic blood pressure goes above 140 or diastolic is above 90 to return to clinic.  Take medicines as directed, call for any problems, patient not having or any worrisome symptoms.    Caregiver has been watching her blood pressure closely.  It is good today.  She is going to drop off a piece paper that where they been measuring her blood pressure, weight, and oxygen saturation.

## 2025-02-06 NOTE — PROGRESS NOTES
Telehealth E-Visit      Patient Name: Emily Green  : 1935   MRN: [id number]     Chief Complaint:    Chief Complaint   Patient presents with    Hypertension    Depression    Hyperlipidemia       I have reviewed the E-Visit questionnaire and the patient's answers, my assessment and plan are listed below.   You have chosen to receive care through a telehealth visit.  Do you consent to use a video/audio connection for your medical care today? Yes patient is aware of the limitations through to health visits and she is here for refill of her pain medicine for her neuropathy and chronic pain.  Patient is at home and I am in the office.     History of Present Illness: Emily Green is a 89 y.o. female who is here today to follow up with chronic pain      Subjective      Review of Systems:   Review of Systems   Constitutional:  Positive for fatigue.   HENT: Negative.     Eyes: Negative.    Respiratory: Negative.     Cardiovascular: Negative.    Gastrointestinal: Negative.    Musculoskeletal:  Positive for arthralgias and myalgias.   Neurological: Negative.        I have reviewed and the following portions of the patient's history were updated as appropriate: past family history, past medical history, past social history, past surgical history and problem list.    Medications:     Current Outpatient Medications:     alendronate (FOSAMAX) 70 MG tablet, Take 1 tablet by mouth 1 (One) Time Per Week., Disp: 4 tablet, Rfl: 5    amLODIPine (NORVASC) 5 MG tablet, Take 1 tablet by mouth Daily., Disp: 90 tablet, Rfl: 1    apixaban (Eliquis) 5 MG tablet tablet, Take 1 tablet by mouth 2 (Two) Times a Day., Disp: 60 tablet, Rfl: 3    atorvastatin (LIPITOR) 20 MG tablet, Take 1 tablet by mouth Daily., Disp: 90 tablet, Rfl: 1    bumetanide (BUMEX) 1 MG tablet, Take 1 tablet by mouth Daily., Disp: 90 tablet, Rfl: 0    carvedilol (COREG) 6.25 MG tablet, Take 1 tablet by mouth 2 (Two) Times a Day With Meals., Disp: 180 tablet,  "Rfl: 1    escitalopram (LEXAPRO) 20 MG tablet, Take 1 tablet by mouth Daily., Disp: 90 tablet, Rfl: 0    famotidine (PEPCID) 40 MG tablet, Take 1 tablet by mouth every night at bedtime., Disp: 90 tablet, Rfl: 0    ferrous sulfate 325 (65 FE) MG tablet, Take 1 tablet by mouth 3 (Three) Times a Day., Disp: 270 tablet, Rfl: 0    gabapentin (NEURONTIN) 400 MG capsule, Take 1 capsule by mouth 3 (Three) Times a Day., Disp: 90 capsule, Rfl: 2    HYDROcodone-acetaminophen (NORCO)  MG per tablet, Take 1 tablet by mouth Every 8 (Eight) Hours As Needed for Moderate Pain., Disp: 90 tablet, Rfl: 0    meclizine (ANTIVERT) 25 MG tablet, Take 1 tablet by mouth 3 (Three) Times a Day As Needed for Dizziness., Disp: 90 tablet, Rfl: 11    potassium chloride (Klor-Con M20) 20 MEQ CR tablet, Take 1 tablet by mouth Daily., Disp: 90 tablet, Rfl: 1    sildenafil (REVATIO) 20 MG tablet, Take 1 tablet by mouth 3 (Three) Times a Day., Disp: 90 tablet, Rfl: 5    spironolactone (ALDACTONE) 25 MG tablet, Take 1 tablet by mouth Daily., Disp: 90 tablet, Rfl: 1    Allergies:   Allergies   Allergen Reactions    Codeine Nausea And Vomiting    Fludrocortisone Unknown - High Severity    Nitrofurantoin Nausea And Vomiting    Nitrofurantoin Macrocrystal Nausea And Vomiting    Sulfa Antibiotics Unknown (See Comments)       Objective     Physical Exam:  Vital Signs:   Vitals:    02/06/25 1325   BP: 121/57   Pulse: 72   SpO2: 98%   Weight: 40.2 kg (88 lb 9.6 oz)   Height: 172.7 cm (67.99\")     Body mass index is 13.47 kg/m².    Physical Exam  Vitals and nursing note reviewed.   Constitutional:       Appearance: Normal appearance.   HENT:      Head: Normocephalic and atraumatic.      Right Ear: External ear normal.      Left Ear: External ear normal.      Nose: Nose normal.   Eyes:      Extraocular Movements: Extraocular movements intact.      Conjunctiva/sclera: Conjunctivae normal.   Pulmonary:      Effort: Pulmonary effort is normal. "   Musculoskeletal:      Cervical back: Normal range of motion.   Feet:      Comments:      Neurological:      General: No focal deficit present.      Mental Status: She is alert and oriented to person, place, and time. Mental status is at baseline.   Psychiatric:         Mood and Affect: Mood normal.         Behavior: Behavior normal.         Thought Content: Thought content normal.         Judgment: Judgment normal.         Assessment / Plan      Assessment/Plan:   Diagnoses and all orders for this visit:    1. CKD (chronic kidney disease) stage 4, GFR 15-29 ml/min (Primary)  Assessment & Plan:  Patient has been seeing nephrology.  Her GFR is gone up to 43 per patient's caregiver.  I am going to check a CMP and CBC.    Orders:  -     Comprehensive Metabolic Panel; Future  -     CBC & Differential; Future    2. Chronic diastolic congestive heart failure  Assessment & Plan:  Stable on Eliquis, spironolactone, sildenafil, Bumex, and carvedilol.  Patient also on chronic oxygen therapy.      3. Anxiety    4. Gastroesophageal reflux disease without esophagitis    5. Anemia, unspecified type    6. Neuropathy  Assessment & Plan:  Refill gabapentin.  We will have her sign an HP 1 paperwork when she comes I can for her Blood work.      7. Other chronic pain  Assessment & Plan:  I have refilled her hydrocodone last week.  House bill 1 paperwork when she comes in for Blood work.      8. High risk medication use  Assessment & Plan:  Will check a CBC and a CMP.      9. Hypertension, unspecified type  Assessment & Plan:  Discussed with patient to monitor their blood pressure and if systolic blood pressure goes above 140 or diastolic is above 90 to return to clinic.  Take medicines as directed, call for any problems, patient not having or any worrisome symptoms.    Caregiver has been watching her blood pressure closely.  It is good today.  She is going to drop off a piece paper that where they been measuring her blood pressure,  weight, and oxygen saturation.           Follow Up:   Return in about 1 month (around 3/6/2025) for Recheck, Bloodwork 1 week prior to next appointment.    Any medications prescribed have been sent electronically to   [id number] - [place], [place] - [address] - [phone]  - [phone]   [address] [address]  Phone: [phone] Fax: [phone]        Zeeshan Ross MD  [facility]   02/06/25  13:13 EST

## 2025-02-06 NOTE — ASSESSMENT & PLAN NOTE
I have refilled her hydrocodone last week.  House bill 1 paperwork when she comes in for Blood work.

## 2025-02-06 NOTE — ASSESSMENT & PLAN NOTE
Refill gabapentin.  We will have her sign an  1 paperwork when she comes I can for her Blood work.

## 2025-02-06 NOTE — ASSESSMENT & PLAN NOTE
Patient has been seeing nephrology.  Her GFR is gone up to 43 per patient's caregiver.  I am going to check a CMP and CBC.

## 2025-03-02 DIAGNOSIS — I50.32 CHRONIC DIASTOLIC CONGESTIVE HEART FAILURE: Chronic | ICD-10-CM

## 2025-03-03 ENCOUNTER — TELEPHONE (OUTPATIENT)
Dept: FAMILY MEDICINE CLINIC | Facility: CLINIC | Age: OVER 89
End: 2025-03-03
Payer: MEDICARE

## 2025-03-03 RX ORDER — BUMETANIDE 1 MG/1
1 TABLET ORAL DAILY
Qty: 90 TABLET | Refills: 1 | Status: SHIPPED | OUTPATIENT
Start: 2025-03-03

## 2025-03-03 NOTE — TELEPHONE ENCOUNTER
Caller: PEÑA    Relationship: Caregiver (non-relative)    Best call back number: [phone]    What medication are you requesting: SOMETHING OTHER THAN THE     apixaban (Eliquis) 5 MG tablet tablet       Have you had these symptoms before:    [x] Yes  [] No    Have you been treated for these symptoms before:   [x] Yes  [] No    If a prescription is needed, what is your preferred pharmacy and phone number: [facility]/PHARMACY #[id number] - Psychiatric [address] [phone] [facility] [phone] FX     Additional notes: PATIENTS INSURANCE DOES NOT COVER THIS MEDICATION THEY WOULD LIKE TO GET HER ON SOMETHING HER INSURANCE WILL COVER. PLEASE CALL BACK WITH THE UPDATE

## 2025-03-03 NOTE — TELEPHONE ENCOUNTER
Rx Refill Note    Requested Prescriptions     Pending Prescriptions Disp Refills    bumetanide (BUMEX) 1 MG tablet [Pharmacy Med Name: BUMETANIDE 1 MG TABLET] 90 tablet 0     Sig: TAKE 1 TABLET BY MOUTH EVERY DAY        Last office visit with prescribing clinician: 11/6/2024      Next office visit with prescribing clinician: Visit date not found   Last labs:   Last refill: 11/06/2024   Pharmacy (be sure to add in Epic). correct

## 2025-03-18 DIAGNOSIS — G89.29 OTHER CHRONIC PAIN: ICD-10-CM

## 2025-03-18 NOTE — TELEPHONE ENCOUNTER
Rx Refill Note    Requested Prescriptions     Pending Prescriptions Disp Refills    HYDROcodone-acetaminophen (NORCO)  MG per tablet 90 tablet 0     Sig: Take 1 tablet by mouth Every 8 (Eight) Hours As Needed for Moderate Pain.        Last office visit with prescribing clinician: 11/6/2024      Next office visit with prescribing clinician: Visit date not found   Last labs:   Last refill: 02/03/2025   Pharmacy (be sure to add in Epic). correct

## 2025-03-18 NOTE — TELEPHONE ENCOUNTER
Caller: Emily Green    Relationship: Self    Best call back number: [phone]     Requested Prescriptions:   Requested Prescriptions     Pending Prescriptions Disp Refills    HYDROcodone-acetaminophen (NORCO)  MG per tablet 90 tablet 0     Sig: Take 1 tablet by mouth Every 8 (Eight) Hours As Needed for Moderate Pain.        Pharmacy where request should be sent: [facility]/pharmacy #[id number] - [place], [place] - [address] - [phone] [facility] [phone]  [phone]      Last office visit with prescribing clinician: 11/6/2024   Last telemedicine visit with prescribing clinician: 2/6/2025   Next office visit with prescribing clinician: Visit date not found     Does the patient have less than a 3 day supply:  [x] Yes  [] No    Genaro Plata Rep   03/18/25 10:43 EDT

## 2025-03-19 RX ORDER — HYDROCODONE BITARTRATE AND ACETAMINOPHEN 10; 325 MG/1; MG/1
1 TABLET ORAL EVERY 8 HOURS PRN
Qty: 90 TABLET | Refills: 0 | Status: SHIPPED | OUTPATIENT
Start: 2025-03-19

## 2025-04-28 DIAGNOSIS — G62.9 NEUROPATHY: ICD-10-CM

## 2025-04-28 DIAGNOSIS — I10 HYPERTENSION, UNSPECIFIED TYPE: Chronic | ICD-10-CM

## 2025-04-28 RX ORDER — SPIRONOLACTONE 25 MG/1
25 TABLET ORAL DAILY
Qty: 90 TABLET | Refills: 1 | Status: SHIPPED | OUTPATIENT
Start: 2025-04-28

## 2025-04-28 RX ORDER — GABAPENTIN 400 MG/1
400 CAPSULE ORAL 3 TIMES DAILY
Qty: 90 CAPSULE | Refills: 2 | Status: SHIPPED | OUTPATIENT
Start: 2025-04-28

## 2025-05-01 DIAGNOSIS — F41.9 ANXIETY: ICD-10-CM

## 2025-05-01 RX ORDER — ESCITALOPRAM OXALATE 20 MG/1
20 TABLET ORAL DAILY
Qty: 90 TABLET | Refills: 1 | Status: SHIPPED | OUTPATIENT
Start: 2025-05-01

## 2025-05-01 RX ORDER — CARVEDILOL 6.25 MG/1
6.25 TABLET ORAL 2 TIMES DAILY WITH MEALS
Qty: 180 TABLET | Refills: 1 | Status: SHIPPED | OUTPATIENT
Start: 2025-05-01

## 2025-05-23 DIAGNOSIS — G89.29 OTHER CHRONIC PAIN: ICD-10-CM

## 2025-05-23 NOTE — TELEPHONE ENCOUNTER
Caller: PEÑA    Relationship:     Best call back number: [phone]     Requested Prescriptions:   Requested Prescriptions     Pending Prescriptions Disp Refills    HYDROcodone-acetaminophen (NORCO)  MG per tablet 90 tablet 0     Sig: Take 1 tablet by mouth Every 8 (Eight) Hours As Needed for Moderate Pain.        Pharmacy where request should be sent: [facility]/PHARMACY #[id number] - [facility] [address] [phone] [facility] [phone] FX     Last office visit with prescribing clinician: 11/6/2024   Last telemedicine visit with prescribing clinician: 2/6/2025   Next office visit with prescribing clinician: Visit date not found     Additional details provided by patient: PT HAS ABOUT 2 DAYS MEDS LEFT.    Does the patient have less than a 3 day supply:  [x] Yes  [] No    Would you like a call back once the refill request has been completed: [] Yes [x] No    If the office needs to give you a call back, can they leave a voicemail: [] Yes [x] No    Genaro Wild Rep   05/23/25 11:36 EDT

## 2025-05-27 RX ORDER — HYDROCODONE BITARTRATE AND ACETAMINOPHEN 10; 325 MG/1; MG/1
1 TABLET ORAL EVERY 8 HOURS PRN
Qty: 90 TABLET | Refills: 0 | Status: SHIPPED | OUTPATIENT
Start: 2025-05-27

## 2025-07-10 DIAGNOSIS — E78.2 MODERATE MIXED HYPERLIPIDEMIA NOT REQUIRING STATIN THERAPY: Chronic | ICD-10-CM

## 2025-07-10 DIAGNOSIS — I10 HYPERTENSION, UNSPECIFIED TYPE: Chronic | ICD-10-CM

## 2025-07-10 DIAGNOSIS — D64.9 ANEMIA, UNSPECIFIED TYPE: ICD-10-CM

## 2025-07-10 DIAGNOSIS — K21.9 GASTROESOPHAGEAL REFLUX DISEASE WITHOUT ESOPHAGITIS: ICD-10-CM

## 2025-07-10 RX ORDER — AMLODIPINE BESYLATE 5 MG/1
5 TABLET ORAL DAILY
Qty: 90 TABLET | Refills: 1 | Status: SHIPPED | OUTPATIENT
Start: 2025-07-10

## 2025-07-10 RX ORDER — FERROUS SULFATE 325(65) MG
1 TABLET ORAL
Qty: 90 TABLET | Refills: 1 | Status: SHIPPED | OUTPATIENT
Start: 2025-07-10

## 2025-07-10 RX ORDER — ATORVASTATIN CALCIUM 20 MG/1
20 TABLET, FILM COATED ORAL DAILY
Qty: 90 TABLET | Refills: 1 | Status: SHIPPED | OUTPATIENT
Start: 2025-07-10

## 2025-07-10 RX ORDER — FAMOTIDINE 40 MG/1
40 TABLET, FILM COATED ORAL
Qty: 90 TABLET | Refills: 0 | Status: SHIPPED | OUTPATIENT
Start: 2025-07-10

## 2025-07-10 NOTE — TELEPHONE ENCOUNTER
Rx Refill Note    Requested Prescriptions     Pending Prescriptions Disp Refills    ferrous sulfate 325 (65 FE) MG tablet [Pharmacy Med Name: FERROUS SULFATE 325 MG TABLET] 270 tablet 0     Sig: TAKE 1 TABLET BY MOUTH THREE TIMES A DAY        Last office visit with prescribing clinician: 11/6/2024      Next office visit with prescribing clinician: Visit date not found   Last labs:   Last refill: 10/04/2024   Pharmacy (be sure to add in Epic). correct

## 2025-07-14 ENCOUNTER — TELEPHONE (OUTPATIENT)
Dept: FAMILY MEDICINE CLINIC | Facility: CLINIC | Age: OVER 89
End: 2025-07-14
Payer: MEDICARE

## 2025-07-14 NOTE — TELEPHONE ENCOUNTER
HUB TO RELAY    PLEASE LET PATIENT KNOW HER MEDICATION REFILL REQUEST FOR HER GABAPENTIN WAS RECEIVED BY DR. MADDOX BUT PER  HIS REQUEST PATIENT WILL NEED AN APPOINTMENT  IN OFFICE IN ORDER TO GET THIS REFILLED.     I LEFT PATIENT A MESSAGE MAKING HER AWARE.     PLEASE SCHEDULE PATIENT AN APPOINTMENT.

## 2025-07-22 ENCOUNTER — TELEPHONE (OUTPATIENT)
Dept: FAMILY MEDICINE CLINIC | Facility: CLINIC | Age: OVER 89
End: 2025-07-22
Payer: MEDICARE

## 2025-07-22 DIAGNOSIS — G89.29 OTHER CHRONIC PAIN: ICD-10-CM

## 2025-07-22 DIAGNOSIS — I10 HYPERTENSION, UNSPECIFIED TYPE: Chronic | ICD-10-CM

## 2025-07-22 RX ORDER — SPIRONOLACTONE 25 MG/1
25 TABLET ORAL DAILY
Qty: 90 TABLET | Refills: 1 | OUTPATIENT
Start: 2025-07-22

## 2025-07-22 RX ORDER — HYDROCODONE BITARTRATE AND ACETAMINOPHEN 10; 325 MG/1; MG/1
1 TABLET ORAL EVERY 8 HOURS PRN
Qty: 90 TABLET | Refills: 0 | OUTPATIENT
Start: 2025-07-22

## 2025-07-22 NOTE — TELEPHONE ENCOUNTER
Caller: PEÑA ROSS    Relationship: Caregiver (non-relative)    Best call back number: [phone]     Requested Prescriptions:   Requested Prescriptions     Pending Prescriptions Disp Refills    HYDROcodone-acetaminophen (NORCO)  MG per tablet 90 tablet 0     Sig: Take 1 tablet by mouth Every 8 (Eight) Hours As Needed for Moderate Pain.    spironolactone (ALDACTONE) 25 MG tablet 90 tablet 1     Sig: Take 1 tablet by mouth Daily.        Pharmacy where request should be sent: [facility]/PHARMACY #[id number] [place] [address] [phone] [facility] [phone] FX     Last office visit with prescribing clinician: 11/6/2024   Last telemedicine visit with prescribing clinician: 2/6/2025   Next office visit with prescribing clinician: Visit date not found     Does the patient have less than a 3 day supply:  [x] Yes  [] No    Would you like a call back once the refill request has been completed: [] Yes [x] No    If the office needs to give you a call back, can they leave a voicemail: [] Yes [x] No    Genaro Castillo Rep   07/22/25 11:54 EDT

## 2025-07-30 DIAGNOSIS — I50.31 ACUTE DIASTOLIC CONGESTIVE HEART FAILURE: ICD-10-CM

## 2025-07-30 RX ORDER — POTASSIUM CHLORIDE 1500 MG/1
20 TABLET, EXTENDED RELEASE ORAL DAILY
Qty: 90 TABLET | Refills: 0 | Status: SHIPPED | OUTPATIENT
Start: 2025-07-30

## 2025-07-30 NOTE — TELEPHONE ENCOUNTER
Rx Refill Note    Requested Prescriptions     Pending Prescriptions Disp Refills    potassium chloride (KLOR-CON M20) 20 MEQ CR tablet [Pharmacy Med Name: POTASSIUM CL ER 20 MEQ TAB MCR] 90 tablet 0     Sig: TAKE 1 TABLET BY MOUTH EVERY DAY        Last office visit with prescribing clinician: 11/6/2024      Next office visit with prescribing clinician: 8/7/2025   Last labs:   Last refill: 11/06/2024   Pharmacy (be sure to add in Epic). correct

## 2025-08-07 ENCOUNTER — OFFICE VISIT (OUTPATIENT)
Dept: FAMILY MEDICINE CLINIC | Facility: CLINIC | Age: OVER 89
End: 2025-08-07
Payer: MEDICARE

## 2025-08-07 VITALS
BODY MASS INDEX: 13.08 KG/M2 | DIASTOLIC BLOOD PRESSURE: 82 MMHG | RESPIRATION RATE: 16 BRPM | HEIGHT: 68 IN | SYSTOLIC BLOOD PRESSURE: 128 MMHG | WEIGHT: 86.3 LBS | OXYGEN SATURATION: 98 % | HEART RATE: 68 BPM

## 2025-08-07 DIAGNOSIS — M19.90 IDIOPATHIC OSTEOARTHRITIS: ICD-10-CM

## 2025-08-07 DIAGNOSIS — N18.4 CKD (CHRONIC KIDNEY DISEASE) STAGE 4, GFR 15-29 ML/MIN: ICD-10-CM

## 2025-08-07 DIAGNOSIS — G89.29 OTHER CHRONIC PAIN: ICD-10-CM

## 2025-08-07 DIAGNOSIS — D64.9 ANEMIA, UNSPECIFIED TYPE: ICD-10-CM

## 2025-08-07 DIAGNOSIS — I48.11 LONGSTANDING PERSISTENT ATRIAL FIBRILLATION: Chronic | ICD-10-CM

## 2025-08-07 DIAGNOSIS — Z00.00 MEDICARE ANNUAL WELLNESS VISIT, SUBSEQUENT: Primary | ICD-10-CM

## 2025-08-07 DIAGNOSIS — E03.9 HYPOTHYROIDISM, UNSPECIFIED TYPE: ICD-10-CM

## 2025-08-07 DIAGNOSIS — R63.4 LOSS OF WEIGHT: ICD-10-CM

## 2025-08-07 DIAGNOSIS — G62.9 NEUROPATHY: ICD-10-CM

## 2025-08-07 DIAGNOSIS — I10 HYPERTENSION, UNSPECIFIED TYPE: Chronic | ICD-10-CM

## 2025-08-07 RX ORDER — HYDROCODONE BITARTRATE AND ACETAMINOPHEN 10; 325 MG/1; MG/1
1 TABLET ORAL EVERY 8 HOURS PRN
Qty: 90 TABLET | Refills: 0 | Status: SHIPPED | OUTPATIENT
Start: 2025-08-07

## 2025-08-07 RX ORDER — GABAPENTIN 400 MG/1
400 CAPSULE ORAL 3 TIMES DAILY
Qty: 90 CAPSULE | Refills: 2 | Status: SHIPPED | OUTPATIENT
Start: 2025-08-07

## 2025-08-07 RX ORDER — GABAPENTIN 400 MG/1
400 CAPSULE ORAL 3 TIMES DAILY
Qty: 90 CAPSULE | Refills: 2 | OUTPATIENT
Start: 2025-08-07

## 2025-08-08 LAB
25(OH)D3+25(OH)D2 SERPL-MCNC: 42.1 NG/ML (ref 30–100)
ALBUMIN SERPL-MCNC: 4.1 G/DL (ref 3.6–4.6)
ALP SERPL-CCNC: 67 IU/L (ref 44–121)
ALT SERPL-CCNC: 6 IU/L (ref 0–32)
AST SERPL-CCNC: 14 IU/L (ref 0–40)
BASOPHILS # BLD AUTO: 0 X10E3/UL (ref 0–0.2)
BASOPHILS NFR BLD AUTO: 1 %
BILIRUB SERPL-MCNC: 0.6 MG/DL (ref 0–1.2)
BUN SERPL-MCNC: 30 MG/DL (ref 10–36)
BUN/CREAT SERPL: 17 (ref 12–28)
CALCIUM SERPL-MCNC: 9.2 MG/DL (ref 8.7–10.3)
CHLORIDE SERPL-SCNC: 96 MMOL/L (ref 96–106)
CHOLEST SERPL-MCNC: 124 MG/DL (ref 100–199)
CK SERPL-CCNC: 38 U/L (ref 26–161)
CO2 SERPL-SCNC: 22 MMOL/L (ref 20–29)
CREAT SERPL-MCNC: 1.79 MG/DL (ref 0.57–1)
EGFRCR SERPLBLD CKD-EPI 2021: 27 ML/MIN/1.73
EOSINOPHIL # BLD AUTO: 0.6 X10E3/UL (ref 0–0.4)
EOSINOPHIL NFR BLD AUTO: 8 %
ERYTHROCYTE [DISTWIDTH] IN BLOOD BY AUTOMATED COUNT: 11.6 % (ref 11.7–15.4)
GLOBULIN SER CALC-MCNC: 2.6 G/DL (ref 1.5–4.5)
GLUCOSE SERPL-MCNC: 104 MG/DL (ref 70–99)
HCT VFR BLD AUTO: 30.5 % (ref 34–46.6)
HDLC SERPL-MCNC: 35 MG/DL
HGB BLD-MCNC: 9.6 G/DL (ref 11.1–15.9)
IMM GRANULOCYTES # BLD AUTO: 0 X10E3/UL (ref 0–0.1)
IMM GRANULOCYTES NFR BLD AUTO: 0 %
LDLC SERPL CALC-MCNC: 70 MG/DL (ref 0–99)
LYMPHOCYTES # BLD AUTO: 1.1 X10E3/UL (ref 0.7–3.1)
LYMPHOCYTES NFR BLD AUTO: 14 %
MCH RBC QN AUTO: 33 PG (ref 26.6–33)
MCHC RBC AUTO-ENTMCNC: 31.5 G/DL (ref 31.5–35.7)
MCV RBC AUTO: 105 FL (ref 79–97)
MONOCYTES # BLD AUTO: 0.9 X10E3/UL (ref 0.1–0.9)
MONOCYTES NFR BLD AUTO: 11 %
NEUTROPHILS # BLD AUTO: 5.5 X10E3/UL (ref 1.4–7)
NEUTROPHILS NFR BLD AUTO: 66 %
PLATELET # BLD AUTO: 123 X10E3/UL (ref 150–450)
POTASSIUM SERPL-SCNC: 4.2 MMOL/L (ref 3.5–5.2)
PROT SERPL-MCNC: 6.7 G/DL (ref 6–8.5)
RBC # BLD AUTO: 2.91 X10E6/UL (ref 3.77–5.28)
SODIUM SERPL-SCNC: 135 MMOL/L (ref 134–144)
TRIGL SERPL-MCNC: 98 MG/DL (ref 0–149)
TSH SERPL DL<=0.005 MIU/L-ACNC: 2.41 UIU/ML (ref 0.45–4.5)
VLDLC SERPL CALC-MCNC: 19 MG/DL (ref 5–40)
WBC # BLD AUTO: 8.2 X10E3/UL (ref 3.4–10.8)

## 2025-08-10 ENCOUNTER — RESULTS FOLLOW-UP (OUTPATIENT)
Dept: FAMILY MEDICINE CLINIC | Facility: CLINIC | Age: OVER 89
End: 2025-08-10
Payer: MEDICARE

## 2025-08-23 DIAGNOSIS — I50.32 CHRONIC DIASTOLIC CONGESTIVE HEART FAILURE: Chronic | ICD-10-CM

## 2025-08-25 DIAGNOSIS — F41.9 ANXIETY: ICD-10-CM

## 2025-08-25 RX ORDER — ESCITALOPRAM OXALATE 20 MG/1
20 TABLET ORAL DAILY
Qty: 90 TABLET | Refills: 1 | OUTPATIENT
Start: 2025-08-25

## 2025-08-25 RX ORDER — BUMETANIDE 1 MG/1
1 TABLET ORAL DAILY
Qty: 90 TABLET | Refills: 0 | Status: SHIPPED | OUTPATIENT
Start: 2025-08-25